# Patient Record
Sex: FEMALE | Race: WHITE | NOT HISPANIC OR LATINO | Employment: FULL TIME | URBAN - METROPOLITAN AREA
[De-identification: names, ages, dates, MRNs, and addresses within clinical notes are randomized per-mention and may not be internally consistent; named-entity substitution may affect disease eponyms.]

---

## 2019-01-25 ENCOUNTER — OFFICE VISIT (OUTPATIENT)
Dept: OBGYN CLINIC | Facility: CLINIC | Age: 19
End: 2019-01-25
Payer: COMMERCIAL

## 2019-01-25 VITALS
DIASTOLIC BLOOD PRESSURE: 80 MMHG | BODY MASS INDEX: 37.35 KG/M2 | SYSTOLIC BLOOD PRESSURE: 122 MMHG | WEIGHT: 238 LBS | HEIGHT: 67 IN

## 2019-01-25 DIAGNOSIS — Z30.011 INITIATION OF ORAL CONTRACEPTION: ICD-10-CM

## 2019-01-25 DIAGNOSIS — Z30.09 ENCOUNTER FOR COUNSELING REGARDING CONTRACEPTION: Primary | ICD-10-CM

## 2019-01-25 PROCEDURE — 99203 OFFICE O/P NEW LOW 30 MIN: CPT | Performed by: NURSE PRACTITIONER

## 2019-01-25 NOTE — PROGRESS NOTES
Lorene Zavala is a 22 y o  female who presents to establish care and restart OCP  The patient has no complaints today  The patient is sexually active  Pertinent past medical history: none  She was previously on OCPs, last taken in 2017 with no issues  She is requesting to restart OCPs  Menstrual History:    OB History      Para Term  AB Living    0 0 0 0 0 0    SAB TAB Ectopic Multiple Live Births    0 0 0 0 0         Gardasil is up-to-date    Menarche age: 14years old  Patient's last menstrual period was 01/10/2019  The following portions of the patient's history were reviewed and updated as appropriate: allergies, current medications, past family history, past medical history, past social history, past surgical history and problem list     Review of Systems  Pertinent items are noted in HPI  Objective      /80   Ht 5' 7" (1 702 m)   Wt 108 kg (238 lb)   LMP 01/10/2019   Breastfeeding? No   BMI 37 28 kg/m²     Physical Exam   Constitutional: She is oriented to person, place, and time  Vital signs are normal  She appears well-developed and well-nourished  HENT:   Head: Normocephalic and atraumatic  Neck: Neck supple  Cardiovascular: Normal rate and regular rhythm  Pulmonary/Chest: Effort normal and breath sounds normal    Neurological: She is alert and oriented to person, place, and time  Skin: Skin is warm and dry  Nursing note and vitals reviewed  Assessment     22 y o , starting condoms and OCP (estrogen/progesterone), no contraindications  Sharon Altamirano was seen today for contraception      Diagnoses and all orders for this visit:    Encounter for counseling regarding contraception  -     Norethin-Eth Estrad-Fe Biphas 1 MG-10 MCG / 10 MCG TABS; Take 1 tablet by mouth daily for 28 days    Initiation of oral contraception  -     Norethin-Eth Estrad-Fe Biphas 1 MG-10 MCG / 10 MCG TABS; Take 1 tablet by mouth daily for 28 days      I have reviewed the risk and benefits of Oral OCP's, including the risk of blood clots, elevated BP, weight gain and Headaches  Benefits include reducing painful menses and heavy bleeding  Follow up in 2 months

## 2019-03-29 ENCOUNTER — OFFICE VISIT (OUTPATIENT)
Dept: OBGYN CLINIC | Facility: CLINIC | Age: 19
End: 2019-03-29
Payer: COMMERCIAL

## 2019-03-29 VITALS
WEIGHT: 240 LBS | HEIGHT: 67 IN | BODY MASS INDEX: 37.67 KG/M2 | DIASTOLIC BLOOD PRESSURE: 82 MMHG | SYSTOLIC BLOOD PRESSURE: 118 MMHG

## 2019-03-29 DIAGNOSIS — Z30.41 SURVEILLANCE FOR BIRTH CONTROL, ORAL CONTRACEPTIVES: Primary | ICD-10-CM

## 2019-03-29 PROCEDURE — 99213 OFFICE O/P EST LOW 20 MIN: CPT | Performed by: NURSE PRACTITIONER

## 2019-03-29 NOTE — PROGRESS NOTES
Lorene Perez Arvind is a 22 y o  female who presents for contraception follow-up  Lin Schultz was started on oral ocp in January  She is doing well on this method and would like to continue on the oral pill  Denies CP/SOB  Her mood is stable  The patient has no complaints today  The patient is sexually active  Pertinent past medical history: none  Menstrual History:    OB History        0    Para   0    Term   0       0    AB   0    Living   0       SAB   0    TAB   0    Ectopic   0    Multiple   0    Live Births   0                  No LMP recorded  The following portions of the patient's history were reviewed and updated as appropriate: allergies, current medications, past family history, past medical history, past social history, past surgical history and problem list     Review of Systems  Pertinent items are noted in HPI  Objective      /82   Ht 5' 7" (1 702 m)   Wt 109 kg (240 lb)   BMI 37 59 kg/m²     Physical Exam   Constitutional: She is oriented to person, place, and time  Vital signs are normal  She appears well-developed and well-nourished  HENT:   Head: Normocephalic and atraumatic  Neck: Neck supple  Cardiovascular: Normal rate and regular rhythm  Pulmonary/Chest: Effort normal and breath sounds normal    Neurological: She is alert and oriented to person, place, and time  Skin: Skin is warm  Nursing note and vitals reviewed  Isidra Galvin was seen today for follow-up  Diagnoses and all orders for this visit:    Surveillance for birth control, oral contraceptives  -     Norethin-Eth Estrad-Fe Biphas 1 MG-10 MCG / 10 MCG TABS; Take 1 tablet by mouth daily for 84 days       22 y o , continuing OCP (estrogen/progesterone), no contraindications  A year prescription was sent to her pharmacy on file  Plan     Follow up in 1 year or sooner if needed  Ana Ponce

## 2019-04-06 DIAGNOSIS — Z30.011 INITIATION OF ORAL CONTRACEPTION: ICD-10-CM

## 2019-04-06 DIAGNOSIS — Z30.09 ENCOUNTER FOR COUNSELING REGARDING CONTRACEPTION: ICD-10-CM

## 2019-04-09 RX ORDER — NORETHINDRONE ACETATE AND ETHINYL ESTRADIOL, ETHINYL ESTRADIOL AND FERROUS FUMARATE 1MG-10(24)
KIT ORAL
Qty: 28 TABLET | Refills: 1 | Status: SHIPPED | OUTPATIENT
Start: 2019-04-09 | End: 2020-09-08 | Stop reason: ALTCHOICE

## 2019-09-09 DIAGNOSIS — Z30.011 INITIATION OF ORAL CONTRACEPTION: ICD-10-CM

## 2019-09-09 DIAGNOSIS — Z30.09 ENCOUNTER FOR COUNSELING REGARDING CONTRACEPTION: ICD-10-CM

## 2019-09-09 DIAGNOSIS — Z30.41 SURVEILLANCE FOR BIRTH CONTROL, ORAL CONTRACEPTIVES: ICD-10-CM

## 2020-02-28 ENCOUNTER — OFFICE VISIT (OUTPATIENT)
Dept: FAMILY MEDICINE CLINIC | Facility: CLINIC | Age: 20
End: 2020-02-28
Payer: COMMERCIAL

## 2020-02-28 VITALS
BODY MASS INDEX: 36.32 KG/M2 | OXYGEN SATURATION: 99 % | HEART RATE: 116 BPM | TEMPERATURE: 98.4 F | RESPIRATION RATE: 18 BRPM | HEIGHT: 67 IN | DIASTOLIC BLOOD PRESSURE: 80 MMHG | SYSTOLIC BLOOD PRESSURE: 140 MMHG | WEIGHT: 231.4 LBS

## 2020-02-28 DIAGNOSIS — F41.9 ANXIETY AND DEPRESSION: ICD-10-CM

## 2020-02-28 DIAGNOSIS — Z00.00 HEALTHCARE MAINTENANCE: ICD-10-CM

## 2020-02-28 DIAGNOSIS — Z76.89 ENCOUNTER TO ESTABLISH CARE: Primary | ICD-10-CM

## 2020-02-28 DIAGNOSIS — F32.A ANXIETY AND DEPRESSION: ICD-10-CM

## 2020-02-28 DIAGNOSIS — Z11.4 ENCOUNTER FOR SCREENING FOR HIV: ICD-10-CM

## 2020-02-28 DIAGNOSIS — R03.0 ELEVATED BP WITHOUT DIAGNOSIS OF HYPERTENSION: ICD-10-CM

## 2020-02-28 PROCEDURE — 1036F TOBACCO NON-USER: CPT | Performed by: NURSE PRACTITIONER

## 2020-02-28 PROCEDURE — 99203 OFFICE O/P NEW LOW 30 MIN: CPT | Performed by: NURSE PRACTITIONER

## 2020-02-28 PROCEDURE — 3008F BODY MASS INDEX DOCD: CPT | Performed by: NURSE PRACTITIONER

## 2020-02-28 RX ORDER — SERTRALINE HYDROCHLORIDE 25 MG/1
25 TABLET, FILM COATED ORAL DAILY
Qty: 30 TABLET | Refills: 0 | Status: SHIPPED | OUTPATIENT
Start: 2020-02-28 | End: 2020-03-23 | Stop reason: SDUPTHER

## 2020-02-28 NOTE — ASSESSMENT & PLAN NOTE
Discussed at length about importance of weight loss  Patient has been trying to lose weight  Encouraged low carb diet and exercise

## 2020-02-28 NOTE — ASSESSMENT & PLAN NOTE
Advised to keep an eye on the blood pressures at home  Will follow-up in 4 weeks with blood pressure check  Educated on salt reduction and weight loss

## 2020-02-28 NOTE — PROGRESS NOTES
BMI Counseling: Body mass index is 36 24 kg/m²  The BMI is above normal  Nutrition recommendations include decreasing portion sizes, decreasing fast food intake, consuming healthier snacks, limiting drinks that contain sugar and reducing intake of saturated and trans fat  Exercise recommendations include moderate physical activity 150 minutes/week and exercising 3-5 times per week  No pharmacotherapy was ordered  Depression Screening and Follow-up Plan: Patient's depression screening was positive with a PHQ-2 score of 4  Their PHQ-9 score was 16  Patient assessed for underlying major depression  Brief counseling provided and recommend additional follow-up/re-evaluation next office visit  Assessment/Plan:     Chronic Problems:  Anxiety and depression  Will start 25 mg Zoloft daily  Will follow-up in 4 weeks  Elevated BP without diagnosis of hypertension   Advised to keep an eye on the blood pressures at home  Will follow-up in 4 weeks with blood pressure check  Educated on salt reduction and weight loss  BMI 36 0-36 9,adult   Discussed at length about importance of weight loss  Patient has been trying to lose weight  Encouraged low carb diet and exercise  Visit Diagnosis:  Diagnoses and all orders for this visit:    Encounter to establish care    Encounter for screening for HIV  Comments:    Amenable to HIV screening  Orders:  -     HIV 1/2 AG-AB combo; Future    Healthcare maintenance  -     CBC and differential; Future  -     TSH, 3rd generation with Free T4 reflex; Future  -     Comprehensive metabolic panel; Future  -     Lipid panel; Future    Anxiety and depression  -     sertraline (ZOLOFT) 25 mg tablet; Take 1 tablet (25 mg total) by mouth daily    Elevated BP without diagnosis of hypertension    BMI 36 0-36 9,adult    Other orders  -     Cancel: Chlamydia/GC amplified DNA by PCR; Future          Subjective:    Patient ID: Jacobo Jarrett is a 20 y o  female        Patient is to establish care  She has not been seen since pediatrics about 3 years  Has HTN in the family with mom and dad  Concerned about high BP  She has had multiple elevated readings in the past   Today in the office, she is 140/80  She said that she has been a little bit higher in the past   She does have a documented blood pressures of 122/80 and 118/82  She said that she will keep an eye on  her blood pressure at home  She is having issues with anxiety depression  She says that they have flared since being in college  She does live in off campus housing by herself  She says she feels very anxious especially at night, that causes her sleep deprivation  She says some nights she is up all night thinking  She is ready to start on medication for anxiety depression  Denies suicidal thoughts  Going to [employer] for sociology, junior [employer]  Does have OB GYN in [place]-- getting OCP's  The following portions of the patient's history were reviewed and updated as appropriate: allergies, current medications, past family history, past medical history, past social history, past surgical history and problem list     Review of Systems   Constitutional: Negative for chills, fatigue and fever  Respiratory: Negative for chest tightness, shortness of breath and wheezing  Cardiovascular: Negative for chest pain and palpitations  Gastrointestinal: Negative  Genitourinary:        FDP: 1/28/2020   Psychiatric/Behavioral: Positive for dysphoric mood and sleep disturbance  Negative for self-injury and suicidal ideas  The patient is nervous/anxious            /80   Pulse (!) 116   Temp 98 4 °F (36 9 °C) (Tympanic)   Resp 18   Ht 5' 7" (1 702 m)   Wt 105 kg (231 lb 6 4 oz)   SpO2 99%   BMI 36 24 kg/m²   Social History     Socioeconomic History    Marital status: Single     Spouse name: Not on file    Number of children: Not on file    Years of education: Not on file    Highest education level: Not on file   Occupational History    Not on file   Social Needs    Financial resource strain: Not on file    Food insecurity:     Worry: Not on file     Inability: Not on file    Transportation needs:     Medical: Not on file     Non-medical: Not on file   Tobacco Use    Smoking status: Never Smoker    Smokeless tobacco: Never Used   Substance and Sexual Activity    Alcohol use: No    Drug use: No    Sexual activity: Yes     Partners: Male     Birth control/protection: OCP   Lifestyle    Physical activity:     Days per week: Not on file     Minutes per session: Not on file    Stress: Not on file   Relationships    Social connections:     Talks on phone: Not on file     Gets together: Not on file     Attends Sikh service: Not on file     Active member of club or organization: Not on file     Attends meetings of clubs or organizations: Not on file     Relationship status: Not on file    Intimate partner violence:     Fear of current or ex partner: Not on file     Emotionally abused: Not on file     Physically abused: Not on file     Forced sexual activity: Not on file   Other Topics Concern    Not on file   Social History Narrative    Not on file     Past Medical History:   Diagnosis Date    Migraine      Family History   Problem Relation Age of Onset    Breast cancer Mother 39    Hypertension Mother     Hypertension Father     Asthma Brother     Heart defect Brother      Past Surgical History:   Procedure Laterality Date    ANTERIOR CRUCIATE LIGAMENT REPAIR  2016       Current Outpatient Medications:     LO LOESTRIN FE 1 MG-10 MCG / 10 MCG TABS, take 1 tablet by mouth once daily for 28 DAYS, Disp: 28 tablet, Rfl: 1    sertraline (ZOLOFT) 25 mg tablet, Take 1 tablet (25 mg total) by mouth daily, Disp: 30 tablet, Rfl: 0    Allergies   Allergen Reactions    Nickel           Lab Review   No visits with results within 2 Month(s) from this visit     Latest known visit with results is: No results found for any previous visit  Imaging: No results found  Objective:     Physical Exam   Constitutional: She is oriented to person, place, and time  She appears well-developed and well-nourished  HENT:   Head: Normocephalic  Right Ear: Tympanic membrane normal    Left Ear: Tympanic membrane normal    Mouth/Throat: Oropharynx is clear and moist  No oropharyngeal exudate  Eyes: Pupils are equal, round, and reactive to light  Conjunctivae and EOM are normal  Right eye exhibits no discharge  Left eye exhibits no discharge  Neck: Normal range of motion  Neck supple  Cardiovascular: Normal rate, regular rhythm and normal heart sounds  Pulmonary/Chest: Effort normal and breath sounds normal  No respiratory distress  She has no wheezes  Abdominal: Soft  Bowel sounds are normal    Musculoskeletal: Normal range of motion  Lymphadenopathy:     She has no cervical adenopathy  Neurological: She is alert and oriented to person, place, and time  Skin: Skin is warm and dry  Psychiatric: She has a normal mood and affect  Vitals reviewed  Patient Instructions   Take zoloft daily  Weight Management   AMBULATORY CARE:   Why it is important to manage your weight:  Being overweight increases your risk of health conditions such as heart disease, high blood pressure, type 2 diabetes, and certain types of cancer  It can also increase your risk for osteoarthritis, sleep apnea, and other respiratory problems  Aim for a slow, steady weight loss  Even a small amount of weight loss can lower your risk of health problems  How to lose weight safely:  A safe and healthy way to lose weight is to eat fewer calories and get regular exercise  You can lose up about 1 pound a week by decreasing the number of calories you eat by 500 calories each day  You can decrease calories by eating smaller portion sizes or by cutting out high-calorie foods   Read labels to find out how many calories are in the foods you eat  You can also burn calories with exercise such as walking, swimming, or biking  You will be more likely to keep weight off if you make these changes part of your lifestyle  Healthy meal plan for weight management:  A healthy meal plan includes a variety of foods, contains fewer calories, and helps you stay healthy  A healthy meal plan includes the following:  · Eat whole-grain foods more often  A healthy meal plan should contain fiber  Fiber is the part of grains, fruits, and vegetables that is not broken down by your body  Whole-grain foods are healthy and provide extra fiber in your diet  Some examples of whole-grain foods are whole-wheat breads and pastas, oatmeal, brown rice, and bulgur  · Eat a variety of vegetables every day  Include dark, leafy greens such as spinach, kale, eri greens, and mustard greens  Eat yellow and orange vegetables such as carrots, sweet potatoes, and winter squash  · Eat a variety of fruits every day  Choose fresh or canned fruit (canned in its own juice or light syrup) instead of juice  Fruit juice has very little or no fiber  · Eat low-fat dairy foods  Drink fat-free (skim) milk or 1% milk  Eat fat-free yogurt and low-fat cottage cheese  Try low-fat cheeses such as mozzarella and other reduced-fat cheeses  · Choose meat and other protein foods that are low in fat  Choose beans or other legumes such as split peas or lentils  Choose fish, skinless poultry (chicken or turkey), or lean cuts of red meat (beef or pork)  Before you cook meat or poultry, cut off any visible fat  · Use less fat and oil  Try baking foods instead of frying them  Add less fat, such as margarine, sour cream, regular salad dressing and mayonnaise to foods  Eat fewer high-fat foods  Some examples of high-fat foods include french fries, doughnuts, ice cream, and cakes  · Eat fewer sweets  Limit foods and drinks that are high in sugar   This includes candy, cookies, regular soda, and sweetened drinks  Ways to decrease calories:   · Eat smaller portions  ¨ Use a small plate with smaller servings  ¨ Do not eat second helpings  ¨ When you eat at a restaurant, ask for a box and place half of your meal in the box before you eat  ¨ Share an entrée with someone else  · Replace high-calorie snacks with healthy, low-calorie snacks  ¨ Choose fresh fruit, vegetables, fat-free rice cakes, or air-popped popcorn instead of potato chips, nuts, or chocolate  ¨ Choose water or calorie-free drinks instead of soda or sweetened drinks  · Eat regular meals  Skipping meals can lead to overeating later in the day  Eat a healthy snack in place of a meal if you do not have time to eat a regular meal      · Do not shop for groceries when you are hungry  You may be more likely to make unhealthy food choices  Take a grocery list of healthy foods and shop after you have eaten  Exercise:  Exercise at least 30 minutes per day on most days of the week  Some examples of exercise include walking, biking, dancing, and swimming  You can also fit in more physical activity by taking the stairs instead of the elevator or parking farther away from stores  Ask your healthcare provider about the best exercise plan for you  Other things to consider as you try to lose weight:   · Be aware of situations that may give you the urge to overeat, such as eating while watching television  Find ways to avoid these situations  For example, read a book, go for a walk, or do crafts  · Meet with a weight loss support group or friends who are also trying to lose weight  This may help you stay motivated to continue working on your weight loss goals  © 2017 [address] [employer] Information is for End User's use only and may not be sold, redistributed or otherwise used for commercial purposes   All illustrations and images included in CareNotes® are the copyrighted property of [employer]  or [employer]  The above information is an  only  It is not intended as medical advice for individual conditions or treatments  Talk to your doctor, nurse or pharmacist before following any medical regimen to see if it is safe and effective for you  LEONILA Thurston    Portions of the record may have been created with voice recognition software  Occasional wrong word or "sound a like" substitutions may have occurred due to the inherent limitations of voice recognition software  Read the chart carefully and recognize, using context, where substitutions have occurred  BMI Counseling: Body mass index is 36 24 kg/m²  The BMI is above normal  Nutrition recommendations include reducing portion sizes, decreasing overall calorie intake, 3-5 servings of fruits/vegetables daily, reducing fast food intake, consuming healthier snacks, decreasing soda and/or juice intake and moderation in carbohydrate intake  Exercise recommendations include moderate aerobic physical activity for 150 minutes/week, vigorous aerobic physical activity for 75 minutes/week and exercising 3-5 times per week

## 2020-02-28 NOTE — PATIENT INSTRUCTIONS
Take zoloft daily  Weight Management   AMBULATORY CARE:   Why it is important to manage your weight:  Being overweight increases your risk of health conditions such as heart disease, high blood pressure, type 2 diabetes, and certain types of cancer  It can also increase your risk for osteoarthritis, sleep apnea, and other respiratory problems  Aim for a slow, steady weight loss  Even a small amount of weight loss can lower your risk of health problems  How to lose weight safely:  A safe and healthy way to lose weight is to eat fewer calories and get regular exercise  You can lose up about 1 pound a week by decreasing the number of calories you eat by 500 calories each day  You can decrease calories by eating smaller portion sizes or by cutting out high-calorie foods  Read labels to find out how many calories are in the foods you eat  You can also burn calories with exercise such as walking, swimming, or biking  You will be more likely to keep weight off if you make these changes part of your lifestyle  Healthy meal plan for weight management:  A healthy meal plan includes a variety of foods, contains fewer calories, and helps you stay healthy  A healthy meal plan includes the following:  · Eat whole-grain foods more often  A healthy meal plan should contain fiber  Fiber is the part of grains, fruits, and vegetables that is not broken down by your body  Whole-grain foods are healthy and provide extra fiber in your diet  Some examples of whole-grain foods are whole-wheat breads and pastas, oatmeal, brown rice, and bulgur  · Eat a variety of vegetables every day  Include dark, leafy greens such as spinach, kale, eri greens, and mustard greens  Eat yellow and orange vegetables such as carrots, sweet potatoes, and winter squash  · Eat a variety of fruits every day  Choose fresh or canned fruit (canned in its own juice or light syrup) instead of juice  Fruit juice has very little or no fiber      · Eat low-fat dairy foods  Drink fat-free (skim) milk or 1% milk  Eat fat-free yogurt and low-fat cottage cheese  Try low-fat cheeses such as mozzarella and other reduced-fat cheeses  · Choose meat and other protein foods that are low in fat  Choose beans or other legumes such as split peas or lentils  Choose fish, skinless poultry (chicken or turkey), or lean cuts of red meat (beef or pork)  Before you cook meat or poultry, cut off any visible fat  · Use less fat and oil  Try baking foods instead of frying them  Add less fat, such as margarine, sour cream, regular salad dressing and mayonnaise to foods  Eat fewer high-fat foods  Some examples of high-fat foods include french fries, doughnuts, ice cream, and cakes  · Eat fewer sweets  Limit foods and drinks that are high in sugar  This includes candy, cookies, regular soda, and sweetened drinks  Ways to decrease calories:   · Eat smaller portions  ¨ Use a small plate with smaller servings  ¨ Do not eat second helpings  ¨ When you eat at a restaurant, ask for a box and place half of your meal in the box before you eat  ¨ Share an entrée with someone else  · Replace high-calorie snacks with healthy, low-calorie snacks  ¨ Choose fresh fruit, vegetables, fat-free rice cakes, or air-popped popcorn instead of potato chips, nuts, or chocolate  ¨ Choose water or calorie-free drinks instead of soda or sweetened drinks  · Eat regular meals  Skipping meals can lead to overeating later in the day  Eat a healthy snack in place of a meal if you do not have time to eat a regular meal      · Do not shop for groceries when you are hungry  You may be more likely to make unhealthy food choices  Take a grocery list of healthy foods and shop after you have eaten  Exercise:  Exercise at least 30 minutes per day on most days of the week  Some examples of exercise include walking, biking, dancing, and swimming   You can also fit in more physical activity by taking the stairs instead of the elevator or parking farther away from stores  Ask your healthcare provider about the best exercise plan for you  Other things to consider as you try to lose weight:   · Be aware of situations that may give you the urge to overeat, such as eating while watching television  Find ways to avoid these situations  For example, read a book, go for a walk, or do crafts  · Meet with a weight loss support group or friends who are also trying to lose weight  This may help you stay motivated to continue working on your weight loss goals  © 2017 [employer] Information is for End User's use only and may not be sold, redistributed or otherwise used for commercial purposes  All illustrations and images included in CareNotes® are the copyrighted property of [employer]  or [employer] Gao  The above information is an  only  It is not intended as medical advice for individual conditions or treatments  Talk to your doctor, nurse or pharmacist before following any medical regimen to see if it is safe and effective for you

## 2020-03-23 DIAGNOSIS — F41.9 ANXIETY AND DEPRESSION: ICD-10-CM

## 2020-03-23 DIAGNOSIS — F32.A ANXIETY AND DEPRESSION: ICD-10-CM

## 2020-03-23 RX ORDER — SERTRALINE HYDROCHLORIDE 25 MG/1
25 TABLET, FILM COATED ORAL DAILY
Qty: 30 TABLET | Refills: 0 | Status: SHIPPED | OUTPATIENT
Start: 2020-03-23 | End: 2020-03-27 | Stop reason: SDUPTHER

## 2020-03-27 ENCOUNTER — TELEMEDICINE (OUTPATIENT)
Dept: FAMILY MEDICINE CLINIC | Facility: CLINIC | Age: 20
End: 2020-03-27
Payer: COMMERCIAL

## 2020-03-27 DIAGNOSIS — F41.9 ANXIETY AND DEPRESSION: ICD-10-CM

## 2020-03-27 DIAGNOSIS — F32.A ANXIETY AND DEPRESSION: ICD-10-CM

## 2020-03-27 PROCEDURE — 99214 OFFICE O/P EST MOD 30 MIN: CPT | Performed by: NURSE PRACTITIONER

## 2020-03-27 NOTE — PROGRESS NOTES
Virtual Brief Visit    Problem List Items Addressed This Visit        Other    Anxiety and depression                Reason for visit is for follow up on sertraline  Encounter provider LEONILA Ontiveros    Provider located at [facility] [address] [facility]  [address] [place] [address] [address]      Recent Visits  No visits were found meeting these conditions  Showing recent visits within past 7 days and meeting all other requirements     Today's Visits  Date Type Provider Dept   03/27/20 Telemedicine LEONILA Thurston  [facility] [address] today's visits and meeting all other requirements     Future Appointments  No visits were found meeting these conditions  Showing future appointments within next 150 days and meeting all other requirements        After connecting through telephone, the patient was identified by name and date of birth  Yayo Crain was informed that this is a telemedicine visit and that the visit is being conducted through telephone  My office door was closed  No one else was in the room  She acknowledged consent and understanding of privacy and security of the video platform  The patient has agreed to participate and understands they can discontinue the visit at any time  Patient is aware this is a billable service  Subjective  Yayo Crain is a 20 y o  female presenting via telephone visit for a follow up on her sertraline  She is feeling much better on the medication, much less anxiety  She does feel she would benefit from an increased dose  No side effects with the meds  Denies SI         Past Medical History:   Diagnosis Date    Migraine        Past Surgical History:   Procedure Laterality Date    ANTERIOR CRUCIATE LIGAMENT REPAIR  2016       Current Outpatient Medications   Medication Sig Dispense Refill    LO LOESTRIN FE 1 MG-10 MCG / 10 MCG TABS take 1 tablet by mouth once daily for 28 DAYS 28 tablet 1    sertraline (ZOLOFT) 25 mg tablet Take 1 tablet (25 mg total) by mouth daily 30 tablet 0     No current facility-administered medications for this visit  Allergies   Allergen Reactions    Nickel        Review of Systems   Constitutional: Negative  HENT: Negative  Respiratory: Negative  Cardiovascular: Negative  Gastrointestinal: Negative  Psychiatric/Behavioral: Positive for dysphoric mood  Negative for self-injury, sleep disturbance and suicidal ideas  The patient is nervous/anxious (much improved)  Carisa Brooks was seen today for virtual brief visit  Diagnoses and all orders for this visit:    Anxiety and depression  Comments: Will increase sertraline to 50mg daily  I spent 15 minutes with the patient during this visit

## 2020-09-08 ENCOUNTER — OFFICE VISIT (OUTPATIENT)
Dept: FAMILY MEDICINE CLINIC | Facility: CLINIC | Age: 20
End: 2020-09-08
Payer: COMMERCIAL

## 2020-09-08 VITALS
BODY MASS INDEX: 36.73 KG/M2 | HEART RATE: 120 BPM | OXYGEN SATURATION: 99 % | WEIGHT: 234 LBS | TEMPERATURE: 98.6 F | HEIGHT: 67 IN | DIASTOLIC BLOOD PRESSURE: 80 MMHG | SYSTOLIC BLOOD PRESSURE: 124 MMHG

## 2020-09-08 DIAGNOSIS — F41.9 ANXIETY AND DEPRESSION: Primary | ICD-10-CM

## 2020-09-08 DIAGNOSIS — F32.A ANXIETY AND DEPRESSION: Primary | ICD-10-CM

## 2020-09-08 DIAGNOSIS — R19.7 DIARRHEA, UNSPECIFIED TYPE: ICD-10-CM

## 2020-09-08 PROCEDURE — 99214 OFFICE O/P EST MOD 30 MIN: CPT | Performed by: NURSE PRACTITIONER

## 2020-09-08 RX ORDER — ESCITALOPRAM OXALATE 5 MG/1
5 TABLET ORAL DAILY
Qty: 30 TABLET | Refills: 0 | Status: SHIPPED | OUTPATIENT
Start: 2020-09-08 | End: 2020-10-05 | Stop reason: SDUPTHER

## 2020-09-08 NOTE — PROGRESS NOTES
Assessment/Plan:     Chronic Problems:  Anxiety and depression  Will start patient on Lexapro daily  Visit Diagnosis:  Diagnoses and all orders for this visit:    Anxiety and depression  -     escitalopram (LEXAPRO) 5 mg tablet; Take 1 tablet (5 mg total) by mouth daily    Diarrhea, unspecified type  Comments:  Discussed irritable bowel syndrome and anxiety  Patient will start a probiotic, will obtain celiac testing  Will increase hydration and follow-up in 4 weeks  Orders:  -     Celiac Disease Panel; Future          Subjective:    Patient ID: Annie Camacho is a 23 y o  female  Patient presents with stomach pain for months, started in May  Bad stomach cramps in the morning  Sometimes, she will have BM and sometimes she doesn't, just cramping  She does have some constipation, always loose stools  Very uncomfortable prior to BM  She does get very nauseous prior to BM and does have vomiting sometimes  Worse with anxiety and stress  Seems to be unrelated to food  Less frequent hydration recently due to schedule changes  Stopped her zoloft end of May and her OCP end of May  Anxiety is very bad right now  She felt the zoloft kept her awake at night  The following portions of the patient's history were reviewed and updated as appropriate: allergies, current medications, past family history, past medical history, past social history, past surgical history and problem list     Review of Systems   Constitutional: Negative for chills, fatigue and fever  HENT: Negative  Respiratory: Negative for chest tightness, shortness of breath and wheezing  Cardiovascular: Negative for chest pain and palpitations  Gastrointestinal: Positive for abdominal pain, constipation, diarrhea, nausea and vomiting  Negative for blood in stool and rectal pain  Genitourinary: Negative for difficulty urinating, flank pain, frequency, hematuria, pelvic pain and urgency  Musculoskeletal: Negative  Neurological: Negative for dizziness, light-headedness, numbness and headaches  Psychiatric/Behavioral: Negative for dysphoric mood and sleep disturbance  The patient is nervous/anxious            /80   Pulse (!) 120   Temp 98 6 °F (37 °C)   Ht 5' 7" (1 702 m)   Wt 106 kg (234 lb)   SpO2 99%   BMI 36 65 kg/m²   Social History     Socioeconomic History    Marital status: Single     Spouse name: Not on file    Number of children: Not on file    Years of education: Not on file    Highest education level: Not on file   Occupational History    Not on file   Social Needs    Financial resource strain: Not on file    Food insecurity     Worry: Not on file     Inability: Not on file    Transportation needs     Medical: Not on file     Non-medical: Not on file   Tobacco Use    Smoking status: Never Smoker    Smokeless tobacco: Never Used   Substance and Sexual Activity    Alcohol use: No    Drug use: No    Sexual activity: Yes     Partners: Male     Birth control/protection: OCP   Lifestyle    Physical activity     Days per week: Not on file     Minutes per session: Not on file    Stress: Not on file   Relationships    Social connections     Talks on phone: Not on file     Gets together: Not on file     Attends Mormonism service: Not on file     Active member of club or organization: Not on file     Attends meetings of clubs or organizations: Not on file     Relationship status: Not on file    Intimate partner violence     Fear of current or ex partner: Not on file     Emotionally abused: Not on file     Physically abused: Not on file     Forced sexual activity: Not on file   Other Topics Concern    Not on file   Social History Narrative    Not on file     Past Medical History:   Diagnosis Date    Migraine      Family History   Problem Relation Age of Onset    Breast cancer Mother 39    Hypertension Mother     Hypertension Father     Asthma Brother     Heart defect Brother      Past Surgical History:   Procedure Laterality Date    ANTERIOR CRUCIATE LIGAMENT REPAIR  2016       Current Outpatient Medications:     escitalopram (LEXAPRO) 5 mg tablet, Take 1 tablet (5 mg total) by mouth daily, Disp: 30 tablet, Rfl: 0    Allergies   Allergen Reactions    Nickel           Lab Review   No visits with results within 2 Month(s) from this visit  Latest known visit with results is:   No results found for any previous visit  Imaging: No results found  Objective:     Physical Exam  Constitutional:       Appearance: She is well-developed  Cardiovascular:      Rate and Rhythm: Normal rate and regular rhythm  Heart sounds: Normal heart sounds  No murmur  Pulmonary:      Effort: Pulmonary effort is normal  No respiratory distress  Breath sounds: Normal breath sounds  Abdominal:      General: Abdomen is flat  Bowel sounds are normal  There is no distension  Palpations: Abdomen is soft  Tenderness: There is no abdominal tenderness  There is no guarding  Skin:     General: Skin is warm and dry  Neurological:      Mental Status: She is alert and oriented to person, place, and time  There are no Patient Instructions on file for this visit  LEONILA Thurston    Portions of the record may have been created with voice recognition software  Occasional wrong word or "sound a like" substitutions may have occurred due to the inherent limitations of voice recognition software  Read the chart carefully and recognize, using context, where substitutions have occurred

## 2020-09-18 ENCOUNTER — OFFICE VISIT (OUTPATIENT)
Dept: FAMILY MEDICINE CLINIC | Facility: CLINIC | Age: 20
End: 2020-09-18
Payer: COMMERCIAL

## 2020-09-18 VITALS
HEART RATE: 83 BPM | HEIGHT: 67 IN | TEMPERATURE: 97.3 F | DIASTOLIC BLOOD PRESSURE: 80 MMHG | BODY MASS INDEX: 37.35 KG/M2 | SYSTOLIC BLOOD PRESSURE: 124 MMHG | WEIGHT: 238 LBS | OXYGEN SATURATION: 99 %

## 2020-09-18 DIAGNOSIS — K21.9 GASTROESOPHAGEAL REFLUX DISEASE WITHOUT ESOPHAGITIS: Primary | ICD-10-CM

## 2020-09-18 DIAGNOSIS — K58.2 IRRITABLE BOWEL SYNDROME WITH BOTH CONSTIPATION AND DIARRHEA: ICD-10-CM

## 2020-09-18 DIAGNOSIS — R11.0 NAUSEA: ICD-10-CM

## 2020-09-18 PROCEDURE — 99214 OFFICE O/P EST MOD 30 MIN: CPT | Performed by: NURSE PRACTITIONER

## 2020-09-18 RX ORDER — PANTOPRAZOLE SODIUM 40 MG/1
40 TABLET, DELAYED RELEASE ORAL DAILY
Qty: 30 TABLET | Refills: 1 | Status: SHIPPED | OUTPATIENT
Start: 2020-09-18 | End: 2020-12-28 | Stop reason: ALTCHOICE

## 2020-09-18 RX ORDER — NORETHINDRONE ACETATE AND ETHINYL ESTRADIOL AND FERROUS FUMARATE 1MG-20(24)
1 KIT ORAL DAILY
COMMUNITY
End: 2020-12-28 | Stop reason: ALTCHOICE

## 2020-09-18 RX ORDER — ONDANSETRON 4 MG/1
4 TABLET, ORALLY DISINTEGRATING ORAL EVERY 6 HOURS PRN
Qty: 20 TABLET | Refills: 0 | Status: SHIPPED | OUTPATIENT
Start: 2020-09-18 | End: 2021-12-17 | Stop reason: SDUPTHER

## 2020-09-18 NOTE — PROGRESS NOTES
Assessment/Plan:     Chronic Problems:  Gastroesophageal reflux disease without esophagitis  Will start on protonix daily  Discussed GERD lifestyle management with patient  Avoid caffeine, citrus, alcohol, carbonation, sit upright after meals, do not eat too close to bedtime  Visit Diagnosis:  Diagnoses and all orders for this visit:    Gastroesophageal reflux disease without esophagitis  -     pantoprazole (PROTONIX) 40 mg tablet; Take 1 tablet (40 mg total) by mouth daily  -     Ambulatory referral to Gastroenterology; Future    Irritable bowel syndrome with both constipation and diarrhea  -     pantoprazole (PROTONIX) 40 mg tablet; Take 1 tablet (40 mg total) by mouth daily  -     Ambulatory referral to Gastroenterology; Future    Nausea  -     ondansetron (ZOFRAN-ODT) 4 mg disintegrating tablet; Take 1 tablet (4 mg total) by mouth every 6 (six) hours as needed for nausea or vomiting    Other orders  -     norethindrone-ethinyl estradiol-ferrous fumarate (LOESTIN 24 FE) 1-20 MG-MCG(24) per tablet; Take 1 tablet by mouth daily          Subjective:    Patient ID: Natasha Nur is a 23 y o  female  Here for a few weeks of vomiting and nausea  Feels lump in her throat when she needs to vomit  She feels vomiting is becoming more frequent recently  She states she is not pregnant  She did just restart OCP, but vomiting happening prior  Vomiting/nausea does not occur with food  Mostly occurs in the morning  Has acid reflux sometimes  No diet changes recently  No blood in the vomit  Usually throwing up bile/stomach acid or water  She says the vomiting is never related to eating and never has food particles in it  Having some diarrhea-- consistent for past few months  Occurs suddenly, fine after vomiting  She states she does have constant bowel issues with constipation and diarrhea, she believes she has IBS         The following portions of the patient's history were reviewed and updated as appropriate: allergies, current medications, past family history, past medical history, past social history, past surgical history and problem list     Review of Systems   Constitutional: Negative for chills, diaphoresis, fatigue and fever  HENT: Negative  Respiratory: Negative for chest tightness, shortness of breath and wheezing  Cardiovascular: Negative for chest pain and palpitations  Gastrointestinal: Positive for diarrhea, nausea and vomiting  Negative for abdominal distention, abdominal pain and constipation  Bloating   Genitourinary: Negative for difficulty urinating, flank pain, frequency, hematuria, pelvic pain and urgency  Musculoskeletal: Negative  Neurological: Negative for dizziness, light-headedness, numbness and headaches  Psychiatric/Behavioral: Negative for dysphoric mood and sleep disturbance  The patient is nervous/anxious            /80   Pulse 83   Temp (!) 97 3 °F (36 3 °C)   Ht 5' 7" (1 702 m)   Wt 108 kg (238 lb)   SpO2 99%   BMI 37 28 kg/m²   Social History     Socioeconomic History    Marital status: Single     Spouse name: Not on file    Number of children: Not on file    Years of education: Not on file    Highest education level: Not on file   Occupational History    Not on file   Social Needs    Financial resource strain: Not on file    Food insecurity     Worry: Not on file     Inability: Not on file    Transportation needs     Medical: Not on file     Non-medical: Not on file   Tobacco Use    Smoking status: Never Smoker    Smokeless tobacco: Never Used   Substance and Sexual Activity    Alcohol use: No    Drug use: No    Sexual activity: Yes     Partners: Male     Birth control/protection: OCP   Lifestyle    Physical activity     Days per week: Not on file     Minutes per session: Not on file    Stress: Not on file   Relationships    Social connections     Talks on phone: Not on file     Gets together: Not on file     Attends Hinduism service: Not on file     Active member of club or organization: Not on file     Attends meetings of clubs or organizations: Not on file     Relationship status: Not on file    Intimate partner violence     Fear of current or ex partner: Not on file     Emotionally abused: Not on file     Physically abused: Not on file     Forced sexual activity: Not on file   Other Topics Concern    Not on file   Social History Narrative    Not on file     Past Medical History:   Diagnosis Date    Migraine      Family History   Problem Relation Age of Onset    Breast cancer Mother 39    Hypertension Mother     Hypertension Father     Asthma Brother     Heart defect Brother      Past Surgical History:   Procedure Laterality Date    ANTERIOR CRUCIATE LIGAMENT REPAIR  2016       Current Outpatient Medications:     escitalopram (LEXAPRO) 5 mg tablet, Take 1 tablet (5 mg total) by mouth daily, Disp: 30 tablet, Rfl: 0    norethindrone-ethinyl estradiol-ferrous fumarate (LOESTIN 24 FE) 1-20 MG-MCG(24) per tablet, Take 1 tablet by mouth daily, Disp: , Rfl:     ondansetron (ZOFRAN-ODT) 4 mg disintegrating tablet, Take 1 tablet (4 mg total) by mouth every 6 (six) hours as needed for nausea or vomiting, Disp: 20 tablet, Rfl: 0    pantoprazole (PROTONIX) 40 mg tablet, Take 1 tablet (40 mg total) by mouth daily, Disp: 30 tablet, Rfl: 1    Allergies   Allergen Reactions    Nickel           Lab Review   No visits with results within 2 Month(s) from this visit  Latest known visit with results is:   No results found for any previous visit  Imaging: No results found  Objective:     Physical Exam  Constitutional:       Appearance: She is well-developed  Cardiovascular:      Rate and Rhythm: Normal rate and regular rhythm  Heart sounds: Normal heart sounds  No murmur  Pulmonary:      Effort: Pulmonary effort is normal  No respiratory distress  Breath sounds: Normal breath sounds     Abdominal: General: Abdomen is flat  Bowel sounds are normal  There is no distension  Palpations: There is no mass  Tenderness: There is no abdominal tenderness  Skin:     General: Skin is warm and dry  Neurological:      Mental Status: She is alert and oriented to person, place, and time  There are no Patient Instructions on file for this visit  LEONILA Thurston    Portions of the record may have been created with voice recognition software  Occasional wrong word or "sound a like" substitutions may have occurred due to the inherent limitations of voice recognition software  Read the chart carefully and recognize, using context, where substitutions have occurred

## 2020-09-18 NOTE — ASSESSMENT & PLAN NOTE
Will start on protonix daily  Discussed GERD lifestyle management with patient  Avoid caffeine, citrus, alcohol, carbonation, sit upright after meals, do not eat too close to bedtime

## 2020-10-05 ENCOUNTER — OFFICE VISIT (OUTPATIENT)
Dept: GASTROENTEROLOGY | Facility: CLINIC | Age: 20
End: 2020-10-05
Payer: COMMERCIAL

## 2020-10-05 ENCOUNTER — TELEPHONE (OUTPATIENT)
Dept: FAMILY MEDICINE CLINIC | Facility: CLINIC | Age: 20
End: 2020-10-05

## 2020-10-05 VITALS
TEMPERATURE: 97.5 F | HEIGHT: 67 IN | HEART RATE: 62 BPM | BODY MASS INDEX: 37.2 KG/M2 | DIASTOLIC BLOOD PRESSURE: 68 MMHG | SYSTOLIC BLOOD PRESSURE: 116 MMHG | WEIGHT: 237 LBS

## 2020-10-05 DIAGNOSIS — F41.9 ANXIETY AND DEPRESSION: ICD-10-CM

## 2020-10-05 DIAGNOSIS — K21.9 GASTROESOPHAGEAL REFLUX DISEASE WITHOUT ESOPHAGITIS: ICD-10-CM

## 2020-10-05 DIAGNOSIS — K58.2 IRRITABLE BOWEL SYNDROME WITH BOTH CONSTIPATION AND DIARRHEA: ICD-10-CM

## 2020-10-05 DIAGNOSIS — F32.A ANXIETY AND DEPRESSION: ICD-10-CM

## 2020-10-05 PROCEDURE — 99203 OFFICE O/P NEW LOW 30 MIN: CPT | Performed by: PHYSICIAN ASSISTANT

## 2020-10-05 PROCEDURE — 1036F TOBACCO NON-USER: CPT | Performed by: PHYSICIAN ASSISTANT

## 2020-10-05 RX ORDER — ESCITALOPRAM OXALATE 5 MG/1
5 TABLET ORAL DAILY
Qty: 30 TABLET | Refills: 0 | Status: SHIPPED | OUTPATIENT
Start: 2020-10-05 | End: 2020-10-26 | Stop reason: SDUPTHER

## 2020-10-15 LAB — EXT SARS-COV-2: NOT DETECTED

## 2020-10-19 ENCOUNTER — LAB REQUISITION (OUTPATIENT)
Dept: LAB | Facility: HOSPITAL | Age: 20
End: 2020-10-19
Payer: COMMERCIAL

## 2020-10-19 DIAGNOSIS — K63.5 POLYP OF COLON: ICD-10-CM

## 2020-10-19 DIAGNOSIS — K58.0 IRRITABLE BOWEL SYNDROME WITH DIARRHEA: ICD-10-CM

## 2020-10-19 PROCEDURE — 88305 TISSUE EXAM BY PATHOLOGIST: CPT | Performed by: PATHOLOGY

## 2020-10-20 ENCOUNTER — TELEPHONE (OUTPATIENT)
Dept: GASTROENTEROLOGY | Facility: CLINIC | Age: 20
End: 2020-10-20

## 2020-10-26 ENCOUNTER — OFFICE VISIT (OUTPATIENT)
Dept: FAMILY MEDICINE CLINIC | Facility: CLINIC | Age: 20
End: 2020-10-26
Payer: COMMERCIAL

## 2020-10-26 VITALS
RESPIRATION RATE: 14 BRPM | TEMPERATURE: 97.2 F | HEART RATE: 76 BPM | WEIGHT: 242.8 LBS | OXYGEN SATURATION: 99 % | BODY MASS INDEX: 38.11 KG/M2 | DIASTOLIC BLOOD PRESSURE: 85 MMHG | SYSTOLIC BLOOD PRESSURE: 120 MMHG | HEIGHT: 67 IN

## 2020-10-26 DIAGNOSIS — F41.9 ANXIETY AND DEPRESSION: Primary | ICD-10-CM

## 2020-10-26 DIAGNOSIS — K58.2 IRRITABLE BOWEL SYNDROME WITH BOTH CONSTIPATION AND DIARRHEA: ICD-10-CM

## 2020-10-26 DIAGNOSIS — F32.A ANXIETY AND DEPRESSION: Primary | ICD-10-CM

## 2020-10-26 DIAGNOSIS — K21.9 GASTROESOPHAGEAL REFLUX DISEASE WITHOUT ESOPHAGITIS: ICD-10-CM

## 2020-10-26 PROCEDURE — 3725F SCREEN DEPRESSION PERFORMED: CPT | Performed by: NURSE PRACTITIONER

## 2020-10-26 PROCEDURE — 99214 OFFICE O/P EST MOD 30 MIN: CPT | Performed by: NURSE PRACTITIONER

## 2020-10-26 RX ORDER — BUSPIRONE HYDROCHLORIDE 5 MG/1
5 TABLET ORAL 2 TIMES DAILY
Qty: 60 TABLET | Refills: 0 | Status: SHIPPED | OUTPATIENT
Start: 2020-10-26 | End: 2020-12-28 | Stop reason: SDUPTHER

## 2020-10-26 RX ORDER — ESCITALOPRAM OXALATE 10 MG/1
10 TABLET ORAL DAILY
Qty: 30 TABLET | Refills: 1 | Status: SHIPPED | OUTPATIENT
Start: 2020-10-26 | End: 2020-12-28 | Stop reason: SDUPTHER

## 2020-10-28 ENCOUNTER — OFFICE VISIT (OUTPATIENT)
Dept: GASTROENTEROLOGY | Facility: CLINIC | Age: 20
End: 2020-10-28
Payer: COMMERCIAL

## 2020-10-28 VITALS
HEART RATE: 68 BPM | SYSTOLIC BLOOD PRESSURE: 118 MMHG | BODY MASS INDEX: 37.79 KG/M2 | DIASTOLIC BLOOD PRESSURE: 80 MMHG | WEIGHT: 240.8 LBS | TEMPERATURE: 97.8 F | HEIGHT: 67 IN

## 2020-10-28 DIAGNOSIS — K58.2 IRRITABLE BOWEL SYNDROME WITH BOTH CONSTIPATION AND DIARRHEA: Primary | ICD-10-CM

## 2020-10-28 DIAGNOSIS — K21.9 GASTROESOPHAGEAL REFLUX DISEASE WITHOUT ESOPHAGITIS: ICD-10-CM

## 2020-10-28 PROCEDURE — 99213 OFFICE O/P EST LOW 20 MIN: CPT | Performed by: PHYSICIAN ASSISTANT

## 2020-11-20 ENCOUNTER — OFFICE VISIT (OUTPATIENT)
Dept: FAMILY MEDICINE CLINIC | Facility: CLINIC | Age: 20
End: 2020-11-20
Payer: COMMERCIAL

## 2020-11-20 VITALS
BODY MASS INDEX: 37.04 KG/M2 | WEIGHT: 236 LBS | DIASTOLIC BLOOD PRESSURE: 72 MMHG | TEMPERATURE: 97.7 F | SYSTOLIC BLOOD PRESSURE: 116 MMHG | HEIGHT: 67 IN | HEART RATE: 105 BPM | OXYGEN SATURATION: 99 %

## 2020-11-20 DIAGNOSIS — M54.41 ACUTE RIGHT-SIDED LOW BACK PAIN WITH RIGHT-SIDED SCIATICA: Primary | ICD-10-CM

## 2020-11-20 PROCEDURE — 1036F TOBACCO NON-USER: CPT | Performed by: NURSE PRACTITIONER

## 2020-11-20 PROCEDURE — 3008F BODY MASS INDEX DOCD: CPT | Performed by: NURSE PRACTITIONER

## 2020-11-20 PROCEDURE — 99213 OFFICE O/P EST LOW 20 MIN: CPT | Performed by: NURSE PRACTITIONER

## 2020-11-20 RX ORDER — METHOCARBAMOL 500 MG/1
500 TABLET, FILM COATED ORAL 3 TIMES DAILY
Qty: 30 TABLET | Refills: 0 | Status: SHIPPED | OUTPATIENT
Start: 2020-11-20 | End: 2020-12-28 | Stop reason: ALTCHOICE

## 2020-11-20 RX ORDER — METHYLPREDNISOLONE 4 MG/1
TABLET ORAL
Qty: 21 EACH | Refills: 0 | Status: SHIPPED | OUTPATIENT
Start: 2020-11-20 | End: 2020-12-28 | Stop reason: ALTCHOICE

## 2020-11-23 ENCOUNTER — TELEPHONE (OUTPATIENT)
Dept: FAMILY MEDICINE CLINIC | Facility: CLINIC | Age: 20
End: 2020-11-23

## 2020-11-23 DIAGNOSIS — M54.41 ACUTE RIGHT-SIDED LOW BACK PAIN WITH RIGHT-SIDED SCIATICA: Primary | ICD-10-CM

## 2020-11-23 RX ORDER — MELOXICAM 15 MG/1
15 TABLET ORAL DAILY
Qty: 30 TABLET | Refills: 0 | Status: SHIPPED | OUTPATIENT
Start: 2020-11-23 | End: 2020-12-28 | Stop reason: ALTCHOICE

## 2020-11-24 ENCOUNTER — LAB (OUTPATIENT)
Dept: LAB | Facility: HOSPITAL | Age: 20
End: 2020-11-24
Payer: COMMERCIAL

## 2020-11-24 DIAGNOSIS — R19.7 DIARRHEA, UNSPECIFIED TYPE: ICD-10-CM

## 2020-11-24 DIAGNOSIS — Z00.00 HEALTHCARE MAINTENANCE: ICD-10-CM

## 2020-11-24 DIAGNOSIS — M54.41 ACUTE RIGHT-SIDED LOW BACK PAIN WITH RIGHT-SIDED SCIATICA: ICD-10-CM

## 2020-11-24 DIAGNOSIS — Z11.4 ENCOUNTER FOR SCREENING FOR HIV: ICD-10-CM

## 2020-11-24 LAB
ALBUMIN SERPL BCP-MCNC: 3.9 G/DL (ref 3.5–5)
ALP SERPL-CCNC: 50 U/L (ref 46–116)
ALT SERPL W P-5'-P-CCNC: 23 U/L (ref 12–78)
ANION GAP SERPL CALCULATED.3IONS-SCNC: 12 MMOL/L (ref 4–13)
AST SERPL W P-5'-P-CCNC: 9 U/L (ref 5–45)
BASOPHILS # BLD AUTO: 0.04 THOUSANDS/ΜL (ref 0–0.1)
BASOPHILS NFR BLD AUTO: 0 % (ref 0–1)
BILIRUB SERPL-MCNC: 0.6 MG/DL (ref 0.2–1)
BUN SERPL-MCNC: 12 MG/DL (ref 5–25)
CALCIUM SERPL-MCNC: 9.7 MG/DL (ref 8.3–10.1)
CHLORIDE SERPL-SCNC: 103 MMOL/L (ref 100–108)
CHOLEST SERPL-MCNC: 213 MG/DL (ref 50–200)
CO2 SERPL-SCNC: 25 MMOL/L (ref 21–32)
CREAT SERPL-MCNC: 0.63 MG/DL (ref 0.6–1.3)
EOSINOPHIL # BLD AUTO: 0.03 THOUSAND/ΜL (ref 0–0.61)
EOSINOPHIL NFR BLD AUTO: 0 % (ref 0–6)
ERYTHROCYTE [DISTWIDTH] IN BLOOD BY AUTOMATED COUNT: 12.7 % (ref 11.6–15.1)
GFR SERPL CREATININE-BSD FRML MDRD: 129 ML/MIN/1.73SQ M
GLUCOSE P FAST SERPL-MCNC: 76 MG/DL (ref 65–99)
HCT VFR BLD AUTO: 41.1 % (ref 34.8–46.1)
HDLC SERPL-MCNC: 68 MG/DL
HGB BLD-MCNC: 13.7 G/DL (ref 11.5–15.4)
IMM GRANULOCYTES # BLD AUTO: 0.08 THOUSAND/UL (ref 0–0.2)
IMM GRANULOCYTES NFR BLD AUTO: 1 % (ref 0–2)
LDLC SERPL CALC-MCNC: 128 MG/DL (ref 0–100)
LYMPHOCYTES # BLD AUTO: 2.43 THOUSANDS/ΜL (ref 0.6–4.47)
LYMPHOCYTES NFR BLD AUTO: 21 % (ref 14–44)
MCH RBC QN AUTO: 30.7 PG (ref 26.8–34.3)
MCHC RBC AUTO-ENTMCNC: 33.3 G/DL (ref 31.4–37.4)
MCV RBC AUTO: 92 FL (ref 82–98)
MONOCYTES # BLD AUTO: 0.89 THOUSAND/ΜL (ref 0.17–1.22)
MONOCYTES NFR BLD AUTO: 8 % (ref 4–12)
NEUTROPHILS # BLD AUTO: 7.92 THOUSANDS/ΜL (ref 1.85–7.62)
NEUTS SEG NFR BLD AUTO: 70 % (ref 43–75)
NONHDLC SERPL-MCNC: 145 MG/DL
NRBC BLD AUTO-RTO: 0 /100 WBCS
PLATELET # BLD AUTO: 317 THOUSANDS/UL (ref 149–390)
PMV BLD AUTO: 10.9 FL (ref 8.9–12.7)
POTASSIUM SERPL-SCNC: 4.1 MMOL/L (ref 3.5–5.3)
PROT SERPL-MCNC: 8 G/DL (ref 6.4–8.2)
RBC # BLD AUTO: 4.46 MILLION/UL (ref 3.81–5.12)
RHEUMATOID FACT SER QL LA: NEGATIVE
SODIUM SERPL-SCNC: 140 MMOL/L (ref 136–145)
TRIGL SERPL-MCNC: 84 MG/DL
TSH SERPL DL<=0.05 MIU/L-ACNC: 1.16 UIU/ML (ref 0.36–3.74)
WBC # BLD AUTO: 11.39 THOUSAND/UL (ref 4.31–10.16)

## 2020-11-24 PROCEDURE — 86038 ANTINUCLEAR ANTIBODIES: CPT

## 2020-11-24 PROCEDURE — 87389 HIV-1 AG W/HIV-1&-2 AB AG IA: CPT

## 2020-11-24 PROCEDURE — 80053 COMPREHEN METABOLIC PANEL: CPT

## 2020-11-24 PROCEDURE — 86430 RHEUMATOID FACTOR TEST QUAL: CPT

## 2020-11-24 PROCEDURE — 81374 HLA I TYPING 1 ANTIGEN LR: CPT

## 2020-11-24 PROCEDURE — 83516 IMMUNOASSAY NONANTIBODY: CPT

## 2020-11-24 PROCEDURE — 82784 ASSAY IGA/IGD/IGG/IGM EACH: CPT

## 2020-11-24 PROCEDURE — 84443 ASSAY THYROID STIM HORMONE: CPT

## 2020-11-24 PROCEDURE — 36415 COLL VENOUS BLD VENIPUNCTURE: CPT

## 2020-11-24 PROCEDURE — 86255 FLUORESCENT ANTIBODY SCREEN: CPT

## 2020-11-24 PROCEDURE — 86618 LYME DISEASE ANTIBODY: CPT

## 2020-11-24 PROCEDURE — 85025 COMPLETE CBC W/AUTO DIFF WBC: CPT

## 2020-11-24 PROCEDURE — 80061 LIPID PANEL: CPT

## 2020-11-25 ENCOUNTER — TELEPHONE (OUTPATIENT)
Dept: FAMILY MEDICINE CLINIC | Facility: CLINIC | Age: 20
End: 2020-11-25

## 2020-11-25 DIAGNOSIS — D72.829 LEUKOCYTOSIS, UNSPECIFIED TYPE: Primary | ICD-10-CM

## 2020-11-25 LAB
B BURGDOR IGG+IGM SER-ACNC: 27
ENDOMYSIUM IGA SER QL: NEGATIVE
GLIADIN PEPTIDE IGA SER-ACNC: 3 UNITS (ref 0–19)
GLIADIN PEPTIDE IGG SER-ACNC: 3 UNITS (ref 0–19)
HIV 1+2 AB+HIV1 P24 AG SERPL QL IA: NORMAL
IGA SERPL-MCNC: 221 MG/DL (ref 87–352)
RYE IGE QN: NEGATIVE
TTG IGA SER-ACNC: <2 U/ML (ref 0–3)
TTG IGG SER-ACNC: <2 U/ML (ref 0–5)

## 2020-12-01 LAB — HLA-B27 QL NAA+PROBE: NEGATIVE

## 2020-12-02 ENCOUNTER — TELEPHONE (OUTPATIENT)
Dept: FAMILY MEDICINE CLINIC | Facility: CLINIC | Age: 20
End: 2020-12-02

## 2020-12-02 DIAGNOSIS — M54.41 ACUTE RIGHT-SIDED LOW BACK PAIN WITH RIGHT-SIDED SCIATICA: Primary | ICD-10-CM

## 2020-12-14 DIAGNOSIS — M54.16 ACUTE LUMBAR RADICULOPATHY: Primary | ICD-10-CM

## 2020-12-28 ENCOUNTER — OFFICE VISIT (OUTPATIENT)
Dept: FAMILY MEDICINE CLINIC | Facility: CLINIC | Age: 20
End: 2020-12-28
Payer: COMMERCIAL

## 2020-12-28 VITALS
SYSTOLIC BLOOD PRESSURE: 126 MMHG | HEART RATE: 97 BPM | HEIGHT: 67 IN | TEMPERATURE: 97 F | BODY MASS INDEX: 37.35 KG/M2 | RESPIRATION RATE: 18 BRPM | WEIGHT: 238 LBS | OXYGEN SATURATION: 98 % | DIASTOLIC BLOOD PRESSURE: 76 MMHG

## 2020-12-28 DIAGNOSIS — F41.9 ANXIETY AND DEPRESSION: ICD-10-CM

## 2020-12-28 DIAGNOSIS — F51.01 PRIMARY INSOMNIA: Primary | ICD-10-CM

## 2020-12-28 DIAGNOSIS — F32.A ANXIETY AND DEPRESSION: ICD-10-CM

## 2020-12-28 PROCEDURE — 3008F BODY MASS INDEX DOCD: CPT | Performed by: NURSE PRACTITIONER

## 2020-12-28 PROCEDURE — 99214 OFFICE O/P EST MOD 30 MIN: CPT | Performed by: NURSE PRACTITIONER

## 2020-12-28 PROCEDURE — 1036F TOBACCO NON-USER: CPT | Performed by: NURSE PRACTITIONER

## 2020-12-28 RX ORDER — ESCITALOPRAM OXALATE 10 MG/1
10 TABLET ORAL DAILY
Qty: 90 TABLET | Refills: 0 | Status: SHIPPED | OUTPATIENT
Start: 2020-12-28 | End: 2021-04-24 | Stop reason: SDUPTHER

## 2020-12-28 RX ORDER — TRAZODONE HYDROCHLORIDE 50 MG/1
50 TABLET ORAL
Qty: 30 TABLET | Refills: 0 | Status: SHIPPED | OUTPATIENT
Start: 2020-12-28 | End: 2021-01-26 | Stop reason: SDUPTHER

## 2020-12-28 RX ORDER — BUSPIRONE HYDROCHLORIDE 5 MG/1
5 TABLET ORAL 2 TIMES DAILY
Qty: 60 TABLET | Refills: 1 | Status: SHIPPED | OUTPATIENT
Start: 2020-12-28 | End: 2021-06-21 | Stop reason: SDUPTHER

## 2021-01-26 DIAGNOSIS — F51.01 PRIMARY INSOMNIA: ICD-10-CM

## 2021-01-27 RX ORDER — TRAZODONE HYDROCHLORIDE 50 MG/1
50 TABLET ORAL
Qty: 30 TABLET | Refills: 0 | Status: SHIPPED | OUTPATIENT
Start: 2021-01-27 | End: 2021-02-25 | Stop reason: SDUPTHER

## 2021-02-25 DIAGNOSIS — F51.01 PRIMARY INSOMNIA: ICD-10-CM

## 2021-02-25 RX ORDER — TRAZODONE HYDROCHLORIDE 50 MG/1
50 TABLET ORAL
Qty: 30 TABLET | Refills: 0 | Status: SHIPPED | OUTPATIENT
Start: 2021-02-25 | End: 2021-03-27 | Stop reason: SDUPTHER

## 2021-03-04 ENCOUNTER — TELEMEDICINE (OUTPATIENT)
Dept: FAMILY MEDICINE CLINIC | Facility: CLINIC | Age: 21
End: 2021-03-04
Payer: COMMERCIAL

## 2021-03-04 VITALS — TEMPERATURE: 98.2 F | WEIGHT: 235 LBS | BODY MASS INDEX: 36.81 KG/M2

## 2021-03-04 DIAGNOSIS — H00.015 HORDEOLUM EXTERNUM OF LEFT LOWER EYELID: Primary | ICD-10-CM

## 2021-03-04 PROCEDURE — 99213 OFFICE O/P EST LOW 20 MIN: CPT | Performed by: NURSE PRACTITIONER

## 2021-03-04 RX ORDER — BACITRACIN 500 [USP'U]/G
OINTMENT OPHTHALMIC EVERY 4 HOURS
Qty: 3.5 G | Refills: 0 | Status: SHIPPED | OUTPATIENT
Start: 2021-03-04 | End: 2021-03-09

## 2021-03-04 NOTE — PROGRESS NOTES
Virtual Regular Visit      Assessment/Plan:    Problem List Items Addressed This Visit        Other    Hordeolum externum of left lower eyelid - Primary     To begin bacitracin  Counseled on the importance of applying a warm compress and changing all eye makeup  To follow-up if no improvement in 1 week or sooner if symptoms worsen  To ER for severe eye pain or change in vision  Relevant Medications    bacitracin ophthalmic ointment               Reason for visit is   Chief Complaint   Patient presents with    Virtual Regular Visit        Encounter provider Hedda Merlin, [facility]     Provider located at [facility] [address] [place]  [address] [place] [id number] [phone]      Recent Visits  No visits were found meeting these conditions  Showing recent visits within past 7 days and meeting all other requirements     Today's Visits  Date Type Provider Dept   03/04/21 Telemedicine Hedda Merlin, CRNP [address] today's visits and meeting all other requirements     Future Appointments  No visits were found meeting these conditions  Showing future appointments within next 150 days and meeting all other requirements        The patient was identified by name and date of birth  Baltazar Chahal was informed that this is a telemedicine visit and that the visit is being conducted through [employer] and patient was informed that this is not a secure, HIPAA-compliant platform  She agrees to proceed     My office door was closed  No one else was in the room  She acknowledged consent and understanding of privacy and security of the video platform  The patient has agreed to participate and understands they can discontinue the visit at any time  Patient is aware this is a billable service  Subjective  Baltazar Chahal is a 23 y o  female            [facility] presents reporting left eye redness and discomfort that started this morning  She also noted some discharge  Denies deep eye pain, fever, trauma to eye, or recent URI symptoms  Nothing has been used to treat her symptoms  Past Medical History:   Diagnosis Date    Migraine        Past Surgical History:   Procedure Laterality Date    ANTERIOR CRUCIATE LIGAMENT REPAIR  2016       Current Outpatient Medications   Medication Sig Dispense Refill    bacitracin ophthalmic ointment Administer into the left eye every 4 (four) hours for 5 days 3 5 g 0    busPIRone (BUSPAR) 5 mg tablet Take 1 tablet (5 mg total) by mouth 2 (two) times a day 60 tablet 1    escitalopram (LEXAPRO) 10 mg tablet Take 1 tablet (10 mg total) by mouth daily 90 tablet 0    ondansetron (ZOFRAN-ODT) 4 mg disintegrating tablet Take 1 tablet (4 mg total) by mouth every 6 (six) hours as needed for nausea or vomiting 20 tablet 0    traZODone (DESYREL) 50 mg tablet Take 1 tablet (50 mg total) by mouth daily at bedtime as needed for sleep 30 tablet 0     No current facility-administered medications for this visit  Allergies   Allergen Reactions    Nickel        Review of Systems   Constitutional: Negative  HENT: Negative  Eyes: Positive for discharge and redness  Negative for photophobia, pain, itching and visual disturbance  Respiratory: Negative  Cardiovascular: Negative  Neurological: Negative  Psychiatric/Behavioral: Negative  Video Exam    Vitals:    03/04/21 1439   Temp: 98 2 °F (36 8 °C)   Weight: 107 kg (235 lb)       Physical Exam  Vitals signs and nursing note reviewed  Constitutional:       Appearance: Normal appearance  HENT:      Head: Normocephalic and atraumatic  Eyes:     Neck:      Musculoskeletal: Neck supple  Pulmonary:      Effort: Pulmonary effort is normal    Neurological:      General: No focal deficit present  Mental Status: She is alert and oriented to person, place, and time     Psychiatric: Mood and Affect: Mood normal          Behavior: Behavior normal          Thought Content: Thought content normal          Judgment: Judgment normal           I spent 10 minutes directly with the patient during this visit      VIRTUAL VISIT DISCLAIMER    Josselyn Sanchez acknowledges that she has consented to an online visit or consultation  She understands that the online visit is based solely on information provided by her, and that, in the absence of a face-to-face physical evaluation by the physician, the diagnosis she receives is both limited and provisional in terms of accuracy and completeness  This is not intended to replace a full medical face-to-face evaluation by the physician  Josselyn Sanchez understands and accepts these terms

## 2021-03-27 DIAGNOSIS — F51.01 PRIMARY INSOMNIA: ICD-10-CM

## 2021-03-29 RX ORDER — TRAZODONE HYDROCHLORIDE 50 MG/1
50 TABLET ORAL
Qty: 30 TABLET | Refills: 0 | Status: SHIPPED | OUTPATIENT
Start: 2021-03-29 | End: 2021-05-04 | Stop reason: SDUPTHER

## 2021-04-05 ENCOUNTER — OFFICE VISIT (OUTPATIENT)
Dept: FAMILY MEDICINE CLINIC | Facility: CLINIC | Age: 21
End: 2021-04-05
Payer: COMMERCIAL

## 2021-04-05 VITALS
TEMPERATURE: 97.5 F | BODY MASS INDEX: 34.84 KG/M2 | HEIGHT: 67 IN | WEIGHT: 222 LBS | DIASTOLIC BLOOD PRESSURE: 82 MMHG | OXYGEN SATURATION: 98 % | RESPIRATION RATE: 18 BRPM | HEART RATE: 82 BPM | SYSTOLIC BLOOD PRESSURE: 140 MMHG

## 2021-04-05 DIAGNOSIS — F32.A ANXIETY AND DEPRESSION: ICD-10-CM

## 2021-04-05 DIAGNOSIS — F51.01 PRIMARY INSOMNIA: ICD-10-CM

## 2021-04-05 DIAGNOSIS — F41.9 ANXIETY AND DEPRESSION: ICD-10-CM

## 2021-04-05 DIAGNOSIS — Z12.4 CERVICAL CANCER SCREENING: Primary | ICD-10-CM

## 2021-04-05 PROCEDURE — 1036F TOBACCO NON-USER: CPT | Performed by: NURSE PRACTITIONER

## 2021-04-05 PROCEDURE — 3008F BODY MASS INDEX DOCD: CPT | Performed by: NURSE PRACTITIONER

## 2021-04-05 PROCEDURE — 99214 OFFICE O/P EST MOD 30 MIN: CPT | Performed by: NURSE PRACTITIONER

## 2021-04-05 NOTE — PROGRESS NOTES
BMI Counseling: Body mass index is 34 77 kg/m²  The BMI is above normal  Nutrition recommendations include decreasing portion sizes, encouraging healthy choices of fruits and vegetables, decreasing fast food intake, consuming healthier snacks, limiting drinks that contain sugar, moderation in carbohydrate intake, increasing intake of lean protein, reducing intake of saturated and trans fat and reducing intake of cholesterol  Exercise recommendations include moderate physical activity 150 minutes/week, vigorous physical activity 75 minutes/week and exercising 3-5 times per week  No pharmacotherapy was ordered  Assessment/Plan:     Chronic Problems:  Anxiety and depression    Continue Lexapro  Advised to take BuSpar twice daily  Primary insomnia    Continue trazodone as needed  BMI 34 0-34 9,adult  Great job with weight loss  Continue healthy diet and exercise  Visit Diagnosis:  Diagnoses and all orders for this visit:    Cervical cancer screening  -     Ambulatory referral to Obstetrics / Gynecology; Future    Anxiety and depression    Primary insomnia    BMI 34 0-34 9,adult          Subjective:    Patient ID: Dee Cosme is a 23 y o  female  Patient presents for 3 month follow-up on anxiety and depression  Patient states that she is doing great with Lexapro as far as depression  Patient is currently in her last month of college has increased stress recently  Patient does feel increase in anxiety  She takes BuSpar as needed  She is sleeping well with trazodone as needed  The following portions of the patient's history were reviewed and updated as appropriate: allergies, current medications, past family history, past medical history, past social history, past surgical history and problem list     Review of Systems   Constitutional: Negative for chills and fever  HENT: Negative for ear pain and sore throat  Eyes: Negative for pain and visual disturbance     Respiratory: Negative for cough and shortness of breath  Cardiovascular: Negative for chest pain and palpitations  Gastrointestinal: Negative for abdominal pain and vomiting  Genitourinary: Negative for dysuria and hematuria  Musculoskeletal: Negative for arthralgias and back pain  Skin: Negative for color change and rash  Neurological: Negative for seizures and syncope  Psychiatric/Behavioral: Negative for dysphoric mood and sleep disturbance  The patient is nervous/anxious  All other systems reviewed and are negative          /82   Pulse 82   Temp 97 5 °F (36 4 °C) (Tympanic)   Resp 18   Ht 5' 7" (1 702 m)   Wt 101 kg (222 lb)   LMP 04/03/2021 (Exact Date)   SpO2 98%   BMI 34 77 kg/m²   Social History     Socioeconomic History    Marital status: Single     Spouse name: Not on file    Number of children: Not on file    Years of education: Not on file    Highest education level: Not on file   Occupational History    Not on file   Social Needs    Financial resource strain: Not on file    Food insecurity     Worry: Not on file     Inability: Not on file    Transportation needs     Medical: Not on file     Non-medical: Not on file   Tobacco Use    Smoking status: Never Smoker    Smokeless tobacco: Never Used   Substance and Sexual Activity    Alcohol use: No    Drug use: No    Sexual activity: Yes     Partners: Male     Birth control/protection: OCP   Lifestyle    Physical activity     Days per week: Not on file     Minutes per session: Not on file    Stress: Not on file   Relationships    Social connections     Talks on phone: Not on file     Gets together: Not on file     Attends Nondenominational service: Not on file     Active member of club or organization: Not on file     Attends meetings of clubs or organizations: Not on file     Relationship status: Not on file    Intimate partner violence     Fear of current or ex partner: Not on file     Emotionally abused: Not on file Physically abused: Not on file     Forced sexual activity: Not on file   Other Topics Concern    Not on file   Social History Narrative    Not on file     Past Medical History:   Diagnosis Date    Migraine      Family History   Problem Relation Age of Onset    Breast cancer Mother 39    Hypertension Mother     Hypertension Father     Asthma Brother     Heart defect Brother      Past Surgical History:   Procedure Laterality Date    ANTERIOR CRUCIATE LIGAMENT REPAIR  2016       Current Outpatient Medications:     busPIRone (BUSPAR) 5 mg tablet, Take 1 tablet (5 mg total) by mouth 2 (two) times a day, Disp: 60 tablet, Rfl: 1    escitalopram (LEXAPRO) 10 mg tablet, Take 1 tablet (10 mg total) by mouth daily, Disp: 90 tablet, Rfl: 0    ondansetron (ZOFRAN-ODT) 4 mg disintegrating tablet, Take 1 tablet (4 mg total) by mouth every 6 (six) hours as needed for nausea or vomiting, Disp: 20 tablet, Rfl: 0    traZODone (DESYREL) 50 mg tablet, Take 1 tablet (50 mg total) by mouth daily at bedtime as needed for sleep, Disp: 30 tablet, Rfl: 0    Allergies   Allergen Reactions    Nickel           Lab Review   No visits with results within 2 Month(s) from this visit     Latest known visit with results is:   Lab on 11/24/2020   Component Date Value    HIV-1/HIV-2 Ab 11/24/2020 Non-Reactive     WBC 11/24/2020 11 39*    RBC 11/24/2020 4 46     Hemoglobin 11/24/2020 13 7     Hematocrit 11/24/2020 41 1     MCV 11/24/2020 92     MCH 11/24/2020 30 7     MCHC 11/24/2020 33 3     RDW 11/24/2020 12 7     MPV 11/24/2020 10 9     Platelets 17/84/0448 317     nRBC 11/24/2020 0     Neutrophils Relative 11/24/2020 70     Immat GRANS % 11/24/2020 1     Lymphocytes Relative 11/24/2020 21     Monocytes Relative 11/24/2020 8     Eosinophils Relative 11/24/2020 0     Basophils Relative 11/24/2020 0     Neutrophils Absolute 11/24/2020 7 92*    Immature Grans Absolute 11/24/2020 0 08     Lymphocytes Absolute 11/24/2020 2 43     Monocytes Absolute 11/24/2020 0 89     Eosinophils Absolute 11/24/2020 0 03     Basophils Absolute 11/24/2020 0 04     TSH 3RD GENERATON 11/24/2020 1 159     Sodium 11/24/2020 140     Potassium 11/24/2020 4 1     Chloride 11/24/2020 103     CO2 11/24/2020 25     ANION GAP 11/24/2020 12     BUN 11/24/2020 12     Creatinine 11/24/2020 0 63     Glucose, Fasting 11/24/2020 76     Calcium 11/24/2020 9 7     AST 11/24/2020 9     ALT 11/24/2020 23     Alkaline Phosphatase 11/24/2020 50     Total Protein 11/24/2020 8 0     Albumin 11/24/2020 3 9     Total Bilirubin 11/24/2020 0 60     eGFR 11/24/2020 129     Cholesterol 11/24/2020 213*    Triglycerides 11/24/2020 84     HDL, Direct 11/24/2020 68     LDL Calculated 11/24/2020 128*    Non-HDL-Chol (CHOL-HDL) 11/24/2020 145     Lyme total antibody 11/24/2020 27     MARCELLE 11/24/2020 Negative     Rheumatoid Factor 11/24/2020 Negative     HLA B27 11/24/2020 Negative     IgA 11/24/2020 221     Gliadin IgA 11/24/2020 3     Gliadin IgG 11/24/2020 3     Tissue Transglut Ab IGG 11/24/2020 <2     TISSUE TRANSGLUTAMINASE * 11/24/2020 <2     Endomysial IgA 11/24/2020 Negative         Imaging: No results found  Objective:     Physical Exam  Constitutional:       Appearance: She is well-developed  Cardiovascular:      Rate and Rhythm: Normal rate and regular rhythm  Heart sounds: Normal heart sounds  No murmur  Pulmonary:      Effort: Pulmonary effort is normal  No respiratory distress  Breath sounds: Normal breath sounds  Skin:     General: Skin is warm and dry  Neurological:      Mental Status: She is alert and oriented to person, place, and time  There are no Patient Instructions on file for this visit  LEONILA Thurston    Portions of the record may have been created with voice recognition software    Occasional wrong word or "sound a like" substitutions may have occurred due to the inherent limitations of voice recognition software  Read the chart carefully and recognize, using context, where substitutions have occurred

## 2021-04-13 DIAGNOSIS — Z23 ENCOUNTER FOR IMMUNIZATION: ICD-10-CM

## 2021-04-24 DIAGNOSIS — F41.9 ANXIETY AND DEPRESSION: ICD-10-CM

## 2021-04-24 DIAGNOSIS — F32.A ANXIETY AND DEPRESSION: ICD-10-CM

## 2021-04-26 RX ORDER — ESCITALOPRAM OXALATE 10 MG/1
10 TABLET ORAL DAILY
Qty: 90 TABLET | Refills: 1 | Status: SHIPPED | OUTPATIENT
Start: 2021-04-26 | End: 2021-06-29 | Stop reason: SDUPTHER

## 2021-04-29 DIAGNOSIS — F51.01 PRIMARY INSOMNIA: ICD-10-CM

## 2021-05-04 DIAGNOSIS — F51.01 PRIMARY INSOMNIA: ICD-10-CM

## 2021-05-12 RX ORDER — TRAZODONE HYDROCHLORIDE 50 MG/1
50 TABLET ORAL
Qty: 90 TABLET | Refills: 0 | Status: SHIPPED | OUTPATIENT
Start: 2021-05-12 | End: 2021-10-08 | Stop reason: SDUPTHER

## 2021-05-13 RX ORDER — TRAZODONE HYDROCHLORIDE 50 MG/1
50 TABLET ORAL
Qty: 30 TABLET | Refills: 0 | Status: SHIPPED | OUTPATIENT
Start: 2021-05-13 | End: 2021-10-08 | Stop reason: ALTCHOICE

## 2021-05-24 ENCOUNTER — TELEPHONE (OUTPATIENT)
Dept: GASTROENTEROLOGY | Facility: CLINIC | Age: 21
End: 2021-05-24

## 2021-05-24 NOTE — TELEPHONE ENCOUNTER
Aleksandra - patient left msg in [facility]   Called patient lmom for patient to call office to schedule OV with Rahat De La Torre

## 2021-05-25 ENCOUNTER — TELEPHONE (OUTPATIENT)
Dept: GASTROENTEROLOGY | Facility: CLINIC | Age: 21
End: 2021-05-25

## 2021-05-25 NOTE — TELEPHONE ENCOUNTER
Aleksandra patient - LMOM to RTRN call to [phone] option 1 to schedule appt for GERD issues   Deejayx

## 2021-06-02 ENCOUNTER — OFFICE VISIT (OUTPATIENT)
Dept: GASTROENTEROLOGY | Facility: CLINIC | Age: 21
End: 2021-06-02
Payer: COMMERCIAL

## 2021-06-02 VITALS
DIASTOLIC BLOOD PRESSURE: 88 MMHG | HEIGHT: 67 IN | SYSTOLIC BLOOD PRESSURE: 130 MMHG | WEIGHT: 216.6 LBS | BODY MASS INDEX: 34 KG/M2 | HEART RATE: 72 BPM

## 2021-06-02 DIAGNOSIS — K21.9 GASTROESOPHAGEAL REFLUX DISEASE WITHOUT ESOPHAGITIS: Primary | ICD-10-CM

## 2021-06-02 PROCEDURE — 99213 OFFICE O/P EST LOW 20 MIN: CPT | Performed by: PHYSICIAN ASSISTANT

## 2021-06-02 PROCEDURE — 3008F BODY MASS INDEX DOCD: CPT | Performed by: PHYSICIAN ASSISTANT

## 2021-06-02 PROCEDURE — 1036F TOBACCO NON-USER: CPT | Performed by: PHYSICIAN ASSISTANT

## 2021-06-02 RX ORDER — PANTOPRAZOLE SODIUM 40 MG/1
40 TABLET, DELAYED RELEASE ORAL DAILY
Qty: 30 TABLET | Refills: 3 | Status: SHIPPED | OUTPATIENT
Start: 2021-06-02 | End: 2022-03-31 | Stop reason: SDUPTHER

## 2021-06-02 NOTE — PATIENT INSTRUCTIONS
Gastroesophageal Reflux Disease   WHAT YOU NEED TO KNOW:   Gastroesophageal reflux disease (GERD) is reflux that occurs more than twice a week for a few weeks  Reflux means acid and food in the stomach back up into the esophagus  It usually causes heartburn and other symptoms  GERD can cause other health problems over time if it is not treated  DISCHARGE INSTRUCTIONS:   Call your local emergency number (911 in the [address] [facility]) if:   · You have severe chest pain and sudden trouble breathing  Return to the emergency department if:   · You have trouble breathing after you vomit  · You have trouble swallowing, or pain with swallowing  · Your bowel movements are black, bloody, or tarry-looking  · Your vomit looks like coffee grounds or has blood in it  Call your doctor or gastroenterologist if:   · You feel full and cannot burp or vomit  · You vomit large amounts, or you vomit often  · You are losing weight without trying  · Your symptoms get worse or do not improve with treatment  · You have questions or concerns about your condition or care  Medicines:   · Medicines  are used to decrease stomach acid  Medicine may also be used to help your lower esophageal sphincter and stomach contract (tighten) more  · Take your medicine as directed  Contact your healthcare provider if you think your medicine is not helping or if you have side effects  Tell him of her if you are allergic to any medicine  Keep a list of the medicines, vitamins, and herbs you take  Include the amounts, and when and why you take them  Bring the list or the pill bottles to follow-up visits  Carry your medicine list with you in case of an emergency  Manage GERD:   · Do not have foods or drinks that may increase heartburn  These include chocolate, peppermint, fried or fatty foods, drinks that contain caffeine, or carbonated drinks (soda)  Other foods include spicy foods, onions, tomatoes, and tomato-based foods   Do not have foods or drinks that can irritate your esophagus, such as citrus fruits, juices, and alcohol  · Do not eat large meals  When you eat a lot of food at one time, your stomach needs more acid to digest it  Eat 6 small meals each day instead of 3 large ones, and eat slowly  Do not eat meals 2 to 3 hours before bedtime  · Elevate the head of your bed  Place 6-inch blocks under the head of your bed frame  You may also use more than one pillow under your head and shoulders while you sleep  · Maintain a healthy weight  If you are overweight, weight loss may help relieve symptoms of GERD  · Do not smoke  Smoking weakens the lower esophageal sphincter and increases the risk of GERD  Ask your healthcare provider for information if you currently smoke and need help to quit  E-cigarettes or smokeless tobacco still contain nicotine  Talk to your healthcare provider before you use these products  · Do not wear clothing that is tight around your waist   Tight clothing can put pressure on your stomach and cause or worsen GERD symptoms  Follow up with your doctor or gastroenterologist as directed:  Write down your questions so you remember to ask them during your visits  © Copyright [address] [place] [address] Drive Information is for End User's use only and may not be sold, redistributed or otherwise used for commercial purposes  All illustrations and images included in CareNotes® are the copyrighted property of A D A M , Inc  or [facility] Zeina Cummings   The above information is an  only  It is not intended as medical advice for individual conditions or treatments  Talk to your doctor, nurse or pharmacist before following any medical regimen to see if it is safe and effective for you

## 2021-06-02 NOTE — PROGRESS NOTES
[facility] - Outpatient Follow-up Note  Dee Cosme 23 y o  female MRN: [id number]  Encounter: [id number]          ASSESSMENT AND PLAN:      1  Gastroesophageal reflux disease without esophagitis  -Will restart pantoprazole 40 mg daily for 3 months     -Will hold off on repeat for off be at this time  -GERD dietary triggers reviewed  -Patient will call the office with any alarm symptom  ______________________________________________________________________    SUBJECTIVE:   25-year-old female presents to the office today with chief complaint of acid reflux  Patient was initially evaluated in the fall of 2020 she did undergo an upper endoscopy as well as colonoscopy  Patient's upper endoscopy was a normal examination  Patient completed a 30 day course of pantoprazole and she was feeling great  She reports for the past several weeks she has been having worsening episodes of nausea as well as regurgitation  Patient reports that about 5 times a week she feels like she is going to vomit  She denies any alarm symptoms such as dysphagia, hematemesis, melena  She reports that she does know what her dietary triggers are and she avoids and all cost   Patient has been off PPI therapy for quite some time now  Patient will utilize Pepcid as needed  REVIEW OF SYSTEMS IS OTHERWISE NEGATIVE        Historical Information   Past Medical History:   Diagnosis Date    Migraine      Past Surgical History:   Procedure Laterality Date    ANTERIOR CRUCIATE LIGAMENT REPAIR  2016     Social History   Social History     Substance and Sexual Activity   Alcohol Use No     Social History     Substance and Sexual Activity   Drug Use No     Social History     Tobacco Use   Smoking Status Never Smoker   Smokeless Tobacco Never Used     Family History   Problem Relation Age of Onset    Breast cancer Mother 39    Hypertension Mother     Hypertension Father     Asthma Brother     Heart defect Brother        Meds/Allergies       Current Outpatient Medications:     busPIRone (BUSPAR) 5 mg tablet    escitalopram (LEXAPRO) 10 mg tablet    ondansetron (ZOFRAN-ODT) 4 mg disintegrating tablet    traZODone (DESYREL) 50 mg tablet    pantoprazole (PROTONIX) 40 mg tablet    traZODone (DESYREL) 50 mg tablet    Allergies   Allergen Reactions    Nickel            Objective     Blood pressure 130/88, pulse 72, height 5' 7" (1 702 m), weight 98 2 kg (216 lb 9 6 oz), not currently breastfeeding  Body mass index is 33 92 kg/m²  PHYSICAL EXAM:      General Appearance:   Alert, cooperative, no distress   HEENT:   Normocephalic, atraumatic, anicteric      Neck:  Supple, symmetrical, trachea midline   Lungs:   Clear to auscultation bilaterally; no rales, rhonchi or wheezing; respirations unlabored    Heart[de-identified]   Regular rate and rhythm; no murmur, rub, or gallop  Abdomen:   Soft, non-tender, non-distended; normal bowel sounds; no masses, no organomegaly    Genitalia:   Deferred    Rectal:   Deferred    Extremities:  No cyanosis, clubbing or edema    Pulses:  2+ and symmetric    Skin:  No jaundice, rashes, or lesions    Lymph nodes:  No palpable cervical lymphadenopathy        Lab Results:   No visits with results within 1 Day(s) from this visit     Latest known visit with results is:   Lab on 11/24/2020   Component Date Value    HIV-1/HIV-2 Ab 11/24/2020 Non-Reactive     WBC 11/24/2020 11 39*    RBC 11/24/2020 4 46     Hemoglobin 11/24/2020 13 7     Hematocrit 11/24/2020 41 1     MCV 11/24/2020 92     MCH 11/24/2020 30 7     MCHC 11/24/2020 33 3     RDW 11/24/2020 12 7     MPV 11/24/2020 10 9     Platelets 58/41/3959 317     nRBC 11/24/2020 0     Neutrophils Relative 11/24/2020 70     Immat GRANS % 11/24/2020 1     Lymphocytes Relative 11/24/2020 21     Monocytes Relative 11/24/2020 8     Eosinophils Relative 11/24/2020 0     Basophils Relative 11/24/2020 0     Neutrophils Absolute 11/24/2020 7 92*    Immature Grans Absolute 11/24/2020 0 08     Lymphocytes Absolute 11/24/2020 2 43     Monocytes Absolute 11/24/2020 0 89     Eosinophils Absolute 11/24/2020 0 03     Basophils Absolute 11/24/2020 0 04     TSH 3RD GENERATON 11/24/2020 1 159     Sodium 11/24/2020 140     Potassium 11/24/2020 4 1     Chloride 11/24/2020 103     CO2 11/24/2020 25     ANION GAP 11/24/2020 12     BUN 11/24/2020 12     Creatinine 11/24/2020 0 63     Glucose, Fasting 11/24/2020 76     Calcium 11/24/2020 9 7     AST 11/24/2020 9     ALT 11/24/2020 23     Alkaline Phosphatase 11/24/2020 50     Total Protein 11/24/2020 8 0     Albumin 11/24/2020 3 9     Total Bilirubin 11/24/2020 0 60     eGFR 11/24/2020 129     Cholesterol 11/24/2020 213*    Triglycerides 11/24/2020 84     HDL, Direct 11/24/2020 68     LDL Calculated 11/24/2020 128*    Non-HDL-Chol (CHOL-HDL) 11/24/2020 145     Lyme total antibody 11/24/2020 27     MARCELLE 11/24/2020 Negative     Rheumatoid Factor 11/24/2020 Negative     HLA B27 11/24/2020 Negative     IgA 11/24/2020 221     Gliadin IgA 11/24/2020 3     Gliadin IgG 11/24/2020 3     Tissue Transglut Ab IGG 11/24/2020 <2     TISSUE TRANSGLUTAMINASE * 11/24/2020 <2     Endomysial IgA 11/24/2020 Negative          Radiology Results:   No results found

## 2021-06-21 DIAGNOSIS — F32.A ANXIETY AND DEPRESSION: ICD-10-CM

## 2021-06-21 DIAGNOSIS — F41.9 ANXIETY AND DEPRESSION: ICD-10-CM

## 2021-06-22 RX ORDER — BUSPIRONE HYDROCHLORIDE 5 MG/1
5 TABLET ORAL 2 TIMES DAILY
Qty: 60 TABLET | Refills: 0 | Status: SHIPPED | OUTPATIENT
Start: 2021-06-22 | End: 2021-10-08 | Stop reason: ALTCHOICE

## 2021-06-29 DIAGNOSIS — F41.9 ANXIETY AND DEPRESSION: ICD-10-CM

## 2021-06-29 DIAGNOSIS — F32.A ANXIETY AND DEPRESSION: ICD-10-CM

## 2021-07-03 RX ORDER — ESCITALOPRAM OXALATE 10 MG/1
10 TABLET ORAL DAILY
Qty: 90 TABLET | Refills: 1 | Status: SHIPPED | OUTPATIENT
Start: 2021-07-03 | End: 2021-09-01 | Stop reason: DRUGHIGH

## 2021-07-27 ENCOUNTER — OFFICE VISIT (OUTPATIENT)
Dept: FAMILY MEDICINE CLINIC | Facility: CLINIC | Age: 21
End: 2021-07-27
Payer: COMMERCIAL

## 2021-07-27 VITALS
DIASTOLIC BLOOD PRESSURE: 83 MMHG | HEIGHT: 67 IN | HEART RATE: 98 BPM | OXYGEN SATURATION: 98 % | RESPIRATION RATE: 16 BRPM | WEIGHT: 208.6 LBS | TEMPERATURE: 98.9 F | BODY MASS INDEX: 32.74 KG/M2 | SYSTOLIC BLOOD PRESSURE: 124 MMHG

## 2021-07-27 DIAGNOSIS — K08.89 PAIN, DENTAL: Primary | ICD-10-CM

## 2021-07-27 PROCEDURE — 3725F SCREEN DEPRESSION PERFORMED: CPT | Performed by: FAMILY MEDICINE

## 2021-07-27 PROCEDURE — 99213 OFFICE O/P EST LOW 20 MIN: CPT | Performed by: FAMILY MEDICINE

## 2021-07-27 PROCEDURE — 3008F BODY MASS INDEX DOCD: CPT | Performed by: FAMILY MEDICINE

## 2021-07-27 PROCEDURE — 1036F TOBACCO NON-USER: CPT | Performed by: FAMILY MEDICINE

## 2021-07-27 NOTE — PROGRESS NOTES
Assessment/Plan:     Chronic Problems:  No problem-specific Assessment & Plan notes found for this encounter  Visit Diagnosis:  There are no diagnoses linked to this encounter  Subjective:    Patient ID: Sarah Ernst is a 23 y o  female  Dental pain   left side , lower rear  Neg fever chill ,  Denies any direct injury  Has been self treating with tylenol and salt water gargle    Has scheduled follow-up with dentist guzman        Dental Pain   This is a new problem  The current episode started in the past 7 days  The problem occurs constantly  The following portions of the patient's history were reviewed and updated as appropriate: allergies, current medications, past family history, past medical history, past social history, past surgical history and problem list     Review of Systems   HENT: Positive for dental problem  All other systems reviewed and are negative          /83   Pulse 98   Temp 98 9 °F (37 2 °C)   Resp 16   Ht 5' 7" (1 702 m)   Wt 94 6 kg (208 lb 9 6 oz)   SpO2 98%   BMI 32 67 kg/m²   Social History     Socioeconomic History    Marital status: Single     Spouse name: Not on file    Number of children: Not on file    Years of education: Not on file    Highest education level: Not on file   Occupational History    Not on file   Tobacco Use    Smoking status: Never Smoker    Smokeless tobacco: Never Used   Vaping Use    Vaping Use: Never used   Substance and Sexual Activity    Alcohol use: No    Drug use: No    Sexual activity: Yes     Partners: Male     Birth control/protection: OCP   Other Topics Concern    Not on file   Social History Narrative    Not on file     Social Determinants of Health     Financial Resource Strain:     Difficulty of Paying Living Expenses:    Food Insecurity:     Worried About Running Out of Food in the Last Year:     [address] in the Last Year:    Transportation Needs:     Lack of Transportation (Medical):  Lack of Transportation (Non-Medical):    Physical Activity:     Days of Exercise per Week:     Minutes of Exercise per Session:    Stress:     Feeling of Stress :    Social Connections:     Frequency of Communication with Friends and Family:     Frequency of Social Gatherings with Friends and Family:     Attends Denominational Services:     Active Member of Clubs or Organizations:     Attends Club or Organization Meetings:     Marital Status:    Intimate Partner Violence:     Fear of Current or Ex-Partner:     Emotionally Abused:     Physically Abused:     Sexually Abused:      Past Medical History:   Diagnosis Date    Migraine      Family History   Problem Relation Age of Onset    Breast cancer Mother 39    Hypertension Mother     Hypertension Father     Asthma Brother     Heart defect Brother      Past Surgical History:   Procedure Laterality Date    ANTERIOR CRUCIATE LIGAMENT REPAIR  2016       Current Outpatient Medications:     busPIRone (BUSPAR) 5 mg tablet, Take 1 tablet (5 mg total) by mouth 2 (two) times a day, Disp: 60 tablet, Rfl: 0    escitalopram (LEXAPRO) 10 mg tablet, Take 1 tablet (10 mg total) by mouth daily, Disp: 90 tablet, Rfl: 1    ondansetron (ZOFRAN-ODT) 4 mg disintegrating tablet, Take 1 tablet (4 mg total) by mouth every 6 (six) hours as needed for nausea or vomiting, Disp: 20 tablet, Rfl: 0    pantoprazole (PROTONIX) 40 mg tablet, Take 1 tablet (40 mg total) by mouth daily, Disp: 30 tablet, Rfl: 3    traZODone (DESYREL) 50 mg tablet, Take 1 tablet (50 mg total) by mouth daily at bedtime as needed for sleep, Disp: 30 tablet, Rfl: 0    traZODone (DESYREL) 50 mg tablet, Take 1 tablet (50 mg total) by mouth daily at bedtime as needed for sleep (Patient not taking: Reported on 6/2/2021), Disp: 90 tablet, Rfl: 0    Allergies   Allergen Reactions    Nickel           Lab Review   not applicable     Imaging: No results found      Objective:     Physical Exam  Constitutional:       General: She is not in acute distress  Appearance: She is not ill-appearing or toxic-appearing  HENT:      Head: Normocephalic and atraumatic  Mouth/Throat:      Mouth: Mucous membranes are dry  No oral lesions  Dentition: No dental tenderness, gingival swelling, dental abscesses or gum lesions  Tongue: No lesions  Palate: No lesions  Pharynx: No oropharyngeal exudate or posterior oropharyngeal erythema  Cardiovascular:      Rate and Rhythm: Normal rate  Pulmonary:      Effort: Pulmonary effort is normal    Musculoskeletal:      Cervical back: Normal range of motion  Lymphadenopathy:      Cervical: No cervical adenopathy  There are no Patient Instructions on file for this visit  LEONILA Vyas    Portions of the record may have been created with voice recognition software  Occasional wrong word or "sound a like" substitutions may have occurred due to the inherent limitations of voice recognition software  Read the chart carefully and recognize, using context, where substitutions have occurred

## 2021-09-01 ENCOUNTER — OFFICE VISIT (OUTPATIENT)
Dept: FAMILY MEDICINE CLINIC | Facility: CLINIC | Age: 21
End: 2021-09-01
Payer: COMMERCIAL

## 2021-09-01 VITALS
DIASTOLIC BLOOD PRESSURE: 78 MMHG | BODY MASS INDEX: 33.9 KG/M2 | HEART RATE: 105 BPM | HEIGHT: 67 IN | SYSTOLIC BLOOD PRESSURE: 120 MMHG | WEIGHT: 216 LBS | OXYGEN SATURATION: 98 % | TEMPERATURE: 97.8 F

## 2021-09-01 DIAGNOSIS — F41.9 ANXIETY AND DEPRESSION: Primary | ICD-10-CM

## 2021-09-01 DIAGNOSIS — F32.A ANXIETY AND DEPRESSION: Primary | ICD-10-CM

## 2021-09-01 DIAGNOSIS — F51.01 PRIMARY INSOMNIA: ICD-10-CM

## 2021-09-01 PROCEDURE — 99213 OFFICE O/P EST LOW 20 MIN: CPT | Performed by: NURSE PRACTITIONER

## 2021-09-01 RX ORDER — ESCITALOPRAM OXALATE 20 MG/1
20 TABLET ORAL DAILY
Qty: 90 TABLET | Refills: 1 | Status: SHIPPED | OUTPATIENT
Start: 2021-09-01 | End: 2021-10-08 | Stop reason: ALTCHOICE

## 2021-09-01 NOTE — PATIENT INSTRUCTIONS
Depression   AMBULATORY CARE:   Depression  is a medical condition that causes feelings of sadness or hopelessness that do not go away  Depression may cause you to lose interest in things you used to enjoy  These feelings may interfere with your daily life  Common symptoms include the following:   · Appetite changes, or weight gain or loss    · Trouble going to sleep or staying asleep, or sleeping too much    · Fatigue or lack of energy    · Feeling restless, irritable, or withdrawn    · Feeling worthless, hopeless, discouraged, or guilty    · Trouble concentrating, remembering things, doing daily tasks, or making decisions    · Thoughts about hurting or killing yourself    Call your local emergency number (911 in the [address] [facility]) if:   · You think about harming yourself or someone else  · You have done something on purpose to hurt yourself  Call your therapist or doctor if:   · Your symptoms do not improve  · You cannot make it to your next appointment  · You have new symptoms  · You have questions or concerns about your condition or care  The following resources are available at any time to help you, if needed:   · [address] [place] [address]: [phone] (7-615-653-KUJN)     · Suicide Hotline: 1-282.557.6517 (0-361-HJFFBCC)     · For a list of international numbers: https://save org/find-help/international-resources/    Treatment for depression  may include medicine to relieve depression  Medicine is often used together with therapy  Therapy is a way for you to talk about your feelings and anything that may be causing depression  Therapy can be done alone or in a group  It may also be done with family members or a significant other  Self-care:   · Get regular physical activity  Try to be active for 30 minutes, 3 to 5 days a week  Physical activity can help relieve depression  Work with your healthcare provider to develop a plan that you enjoy   It may help to ask someone to be active with you     · Create a regular sleep schedule  A routine can help you relax before bed  Listen to music, read, or do yoga  Try to go to bed and wake up at the same time every day  Sleep is important for emotional health  · Eat a variety of healthy foods  Healthy foods include fruits, vegetables, whole-grain breads, low-fat dairy products, lean meats, fish, and cooked beans  A healthy meal plan is low in fat, salt, and added sugar  · Do not drink alcohol or use drugs  Alcohol and drugs can make depression worse  Talk to your therapist or doctor if you need help quitting  Follow up with your healthcare provider as directed: Your healthcare provider will monitor your progress at follow-up visits  He or she will also monitor your medicine if you take antidepressants  Your healthcare provider will ask if the medicine is helping  Tell him or her about any side effects or problems you may have with your medicine  The type or amount of medicine may need to be changed  Write down your questions so you remember to ask them during your visits  © Copyright [employer] 2021 Information is for End User's use only and may not be sold, redistributed or otherwise used for commercial purposes  All illustrations and images included in CareNotes® are the copyrighted property of A D A M , Inc  or Natalie Cummings   The above information is an  only  It is not intended as medical advice for individual conditions or treatments  Talk to your doctor, nurse or pharmacist before following any medical regimen to see if it is safe and effective for you

## 2021-09-01 NOTE — ASSESSMENT & PLAN NOTE
Patient notes "plateau" in symptoms  Notes decreased energy and feels unmotivated  Denies SI/HI  Anxiety not bad  Takes Buspar daily if needed, sometimes twice daily  Lexapro dose increased to 20 mg  Patient agreeable to BHT    Referral given, will re-eval in 3 months

## 2021-09-01 NOTE — PROGRESS NOTES
Assessment/Plan:    Anxiety and depression  Patient notes "plateau" in symptoms  Notes decreased energy and feels unmotivated  Denies SI/HI  Anxiety not bad  Takes Buspar daily if needed, sometimes twice daily  Lexapro dose increased to 20 mg  Patient agreeable to T  Referral given, will re-eval in 3 months    Primary insomnia  Continues trazodone 50 mg at bedtime  Denies issues with sleep  Diagnoses and all orders for this visit:    Anxiety and depression  -     Ambulatory referral to [facility]; Future  -     escitalopram (LEXAPRO) 20 mg tablet; Take 1 tablet (20 mg total) by mouth daily    Primary insomnia          Subjective:      Patient ID: Patric Sanchez is a 24 y o  female  Patient presents for re-evaluation of depression and anxiety  Patient has been on Lexapro for over a year and a half  Started on 5 mg initially, states earlier this year was increased to 10 mg  Patient was prescribed Buspar 5 mg for anxiety  Is to take twice daily, but states she takes it as needed most of the time  Patient notes symptoms of anxiety has lessened  Notes she has "plateaued" with depression symptoms  Recently notes decreased energy and is unmotivated  Patient denies SI/HI  Has string support system at home with family and friends  Is set to start an apprenticeship as an   The following portions of the patient's history were reviewed and updated as appropriate: allergies, current medications, past medical history, past social history, past surgical history and problem list     Review of Systems   Constitutional: Positive for activity change  Negative for appetite change, chills, fatigue and fever  HENT: Negative for congestion, ear pain and sore throat  Eyes: Negative for pain and visual disturbance  Respiratory: Negative for cough, chest tightness and shortness of breath  Cardiovascular: Negative for chest pain, palpitations and leg swelling  Gastrointestinal: Negative for abdominal pain, constipation, diarrhea, nausea and vomiting  Genitourinary: Negative for decreased urine volume, difficulty urinating, dysuria and hematuria  Musculoskeletal: Negative for arthralgias, back pain and myalgias  Skin: Negative for color change and rash  Neurological: Negative for dizziness, syncope, light-headedness and headaches  Psychiatric/Behavioral: Negative for agitation, dysphoric mood, sleep disturbance and suicidal ideas  The patient is not nervous/anxious  Objective:      /78   Pulse 105   Temp 97 8 °F (36 6 °C)   Ht 5' 7" (1 702 m)   Wt 98 kg (216 lb)   LMP 09/01/2021 (Exact Date)   SpO2 98%   BMI 33 83 kg/m²          Physical Exam  Vitals reviewed  Constitutional:       General: She is not in acute distress  Appearance: Normal appearance  She is not ill-appearing  HENT:      Head: Normocephalic and atraumatic  Right Ear: Tympanic membrane, ear canal and external ear normal       Left Ear: Tympanic membrane, ear canal and external ear normal       Nose: Nose normal       Mouth/Throat:      Mouth: Mucous membranes are moist       Pharynx: Oropharynx is clear  Eyes:      Extraocular Movements: Extraocular movements intact  Pupils: Pupils are equal, round, and reactive to light  Cardiovascular:      Rate and Rhythm: Normal rate and regular rhythm  Heart sounds: Normal heart sounds  No murmur heard  Pulmonary:      Effort: Pulmonary effort is normal  No respiratory distress  Breath sounds: Normal breath sounds  Abdominal:      General: Bowel sounds are normal       Palpations: Abdomen is soft  There is no mass  Tenderness: There is no abdominal tenderness  There is no right CVA tenderness or left CVA tenderness  Musculoskeletal:         General: Normal range of motion  Cervical back: Normal range of motion and neck supple  No muscular tenderness  Right lower leg: No edema  Left lower leg: No edema  Lymphadenopathy:      Cervical: No cervical adenopathy  Skin:     General: Skin is warm and dry  Neurological:      Mental Status: She is alert and oriented to person, place, and time  Motor: No weakness  Psychiatric:         Attention and Perception: Attention normal          Mood and Affect: Mood and affect normal          Speech: Speech normal          Behavior: Behavior normal  Behavior is cooperative  Thought Content: Thought content normal  Thought content does not include homicidal or suicidal ideation

## 2021-10-08 ENCOUNTER — OFFICE VISIT (OUTPATIENT)
Dept: FAMILY MEDICINE CLINIC | Facility: CLINIC | Age: 21
End: 2021-10-08
Payer: COMMERCIAL

## 2021-10-08 VITALS
OXYGEN SATURATION: 98 % | BODY MASS INDEX: 32.46 KG/M2 | HEART RATE: 90 BPM | HEIGHT: 67 IN | RESPIRATION RATE: 18 BRPM | DIASTOLIC BLOOD PRESSURE: 72 MMHG | SYSTOLIC BLOOD PRESSURE: 110 MMHG | WEIGHT: 206.8 LBS | TEMPERATURE: 96.2 F

## 2021-10-08 DIAGNOSIS — K58.2 IRRITABLE BOWEL SYNDROME WITH BOTH CONSTIPATION AND DIARRHEA: ICD-10-CM

## 2021-10-08 DIAGNOSIS — F32.A ANXIETY AND DEPRESSION: Primary | ICD-10-CM

## 2021-10-08 DIAGNOSIS — F41.9 ANXIETY AND DEPRESSION: Primary | ICD-10-CM

## 2021-10-08 DIAGNOSIS — Z12.4 CERVICAL CANCER SCREENING: ICD-10-CM

## 2021-10-08 DIAGNOSIS — K21.9 GASTROESOPHAGEAL REFLUX DISEASE WITHOUT ESOPHAGITIS: ICD-10-CM

## 2021-10-08 DIAGNOSIS — F51.01 PRIMARY INSOMNIA: ICD-10-CM

## 2021-10-08 DIAGNOSIS — Z00.00 HEALTHCARE MAINTENANCE: ICD-10-CM

## 2021-10-08 PROBLEM — H00.015 HORDEOLUM EXTERNUM OF LEFT LOWER EYELID: Status: RESOLVED | Noted: 2021-03-04 | Resolved: 2021-10-08

## 2021-10-08 PROBLEM — R03.0 ELEVATED BP WITHOUT DIAGNOSIS OF HYPERTENSION: Status: RESOLVED | Noted: 2020-02-28 | Resolved: 2021-10-08

## 2021-10-08 PROCEDURE — 99214 OFFICE O/P EST MOD 30 MIN: CPT | Performed by: NURSE PRACTITIONER

## 2021-10-08 RX ORDER — TRAZODONE HYDROCHLORIDE 50 MG/1
50 TABLET ORAL
Qty: 90 TABLET | Refills: 1 | Status: SHIPPED | OUTPATIENT
Start: 2021-10-08 | End: 2022-05-18 | Stop reason: SDUPTHER

## 2021-10-08 RX ORDER — ESCITALOPRAM OXALATE 10 MG/1
15 TABLET ORAL DAILY
Qty: 45 TABLET | Refills: 0 | Status: SHIPPED | OUTPATIENT
Start: 2021-10-08 | End: 2021-11-11 | Stop reason: SDUPTHER

## 2021-10-08 RX ORDER — HYDROXYZINE HYDROCHLORIDE 25 MG/1
25 TABLET, FILM COATED ORAL EVERY 6 HOURS PRN
Qty: 30 TABLET | Refills: 0 | Status: SHIPPED | OUTPATIENT
Start: 2021-10-08 | End: 2021-11-23 | Stop reason: ALTCHOICE

## 2021-11-11 DIAGNOSIS — F32.A ANXIETY AND DEPRESSION: ICD-10-CM

## 2021-11-11 DIAGNOSIS — F41.9 ANXIETY AND DEPRESSION: ICD-10-CM

## 2021-11-11 RX ORDER — ESCITALOPRAM OXALATE 10 MG/1
15 TABLET ORAL DAILY
Qty: 45 TABLET | Refills: 2 | Status: SHIPPED | OUTPATIENT
Start: 2021-11-11 | End: 2022-02-10 | Stop reason: SDUPTHER

## 2021-11-23 ENCOUNTER — OFFICE VISIT (OUTPATIENT)
Dept: FAMILY MEDICINE CLINIC | Facility: CLINIC | Age: 21
End: 2021-11-23
Payer: COMMERCIAL

## 2021-11-23 VITALS
DIASTOLIC BLOOD PRESSURE: 84 MMHG | BODY MASS INDEX: 33.81 KG/M2 | HEIGHT: 67 IN | TEMPERATURE: 96.9 F | HEART RATE: 92 BPM | SYSTOLIC BLOOD PRESSURE: 126 MMHG | OXYGEN SATURATION: 100 % | WEIGHT: 215.4 LBS

## 2021-11-23 DIAGNOSIS — R04.0 FREQUENT NOSEBLEEDS: Primary | ICD-10-CM

## 2021-11-23 DIAGNOSIS — F32.A ANXIETY AND DEPRESSION: ICD-10-CM

## 2021-11-23 DIAGNOSIS — F41.9 ANXIETY AND DEPRESSION: ICD-10-CM

## 2021-11-23 PROCEDURE — 99214 OFFICE O/P EST MOD 30 MIN: CPT | Performed by: NURSE PRACTITIONER

## 2021-11-23 RX ORDER — BUSPIRONE HYDROCHLORIDE 5 MG/1
5 TABLET ORAL 2 TIMES DAILY
Qty: 60 TABLET | Refills: 0 | Status: SHIPPED | OUTPATIENT
Start: 2021-11-23 | End: 2022-02-07 | Stop reason: SDUPTHER

## 2021-12-17 DIAGNOSIS — R11.0 NAUSEA: ICD-10-CM

## 2021-12-20 RX ORDER — ONDANSETRON 4 MG/1
4 TABLET, ORALLY DISINTEGRATING ORAL EVERY 6 HOURS PRN
Qty: 20 TABLET | Refills: 0 | Status: SHIPPED | OUTPATIENT
Start: 2021-12-20 | End: 2022-01-21 | Stop reason: SDUPTHER

## 2022-01-21 DIAGNOSIS — R11.0 NAUSEA: ICD-10-CM

## 2022-01-24 RX ORDER — ONDANSETRON 4 MG/1
4 TABLET, ORALLY DISINTEGRATING ORAL EVERY 6 HOURS PRN
Qty: 20 TABLET | Refills: 0 | Status: SHIPPED | OUTPATIENT
Start: 2022-01-24 | End: 2022-04-19 | Stop reason: SDUPTHER

## 2022-02-07 DIAGNOSIS — F41.9 ANXIETY AND DEPRESSION: ICD-10-CM

## 2022-02-07 DIAGNOSIS — F32.A ANXIETY AND DEPRESSION: ICD-10-CM

## 2022-02-07 RX ORDER — BUSPIRONE HYDROCHLORIDE 5 MG/1
5 TABLET ORAL 2 TIMES DAILY
Qty: 60 TABLET | Refills: 0 | Status: SHIPPED | OUTPATIENT
Start: 2022-02-07 | End: 2022-04-19 | Stop reason: SDUPTHER

## 2022-02-10 DIAGNOSIS — F32.A ANXIETY AND DEPRESSION: ICD-10-CM

## 2022-02-10 DIAGNOSIS — F41.9 ANXIETY AND DEPRESSION: ICD-10-CM

## 2022-02-11 RX ORDER — ESCITALOPRAM OXALATE 10 MG/1
15 TABLET ORAL DAILY
Qty: 45 TABLET | Refills: 0 | Status: SHIPPED | OUTPATIENT
Start: 2022-02-11 | End: 2022-03-13 | Stop reason: SDUPTHER

## 2022-03-13 DIAGNOSIS — F41.9 ANXIETY AND DEPRESSION: ICD-10-CM

## 2022-03-13 DIAGNOSIS — F32.A ANXIETY AND DEPRESSION: ICD-10-CM

## 2022-03-14 RX ORDER — ESCITALOPRAM OXALATE 10 MG/1
15 TABLET ORAL DAILY
Qty: 45 TABLET | Refills: 0 | Status: SHIPPED | OUTPATIENT
Start: 2022-03-14 | End: 2022-04-16 | Stop reason: SDUPTHER

## 2022-03-31 ENCOUNTER — TELEPHONE (OUTPATIENT)
Dept: GASTROENTEROLOGY | Facility: CLINIC | Age: 22
End: 2022-03-31

## 2022-03-31 DIAGNOSIS — K21.9 GASTROESOPHAGEAL REFLUX DISEASE WITHOUT ESOPHAGITIS: Primary | ICD-10-CM

## 2022-03-31 RX ORDER — PANTOPRAZOLE SODIUM 40 MG/1
40 TABLET, DELAYED RELEASE ORAL DAILY
Qty: 30 TABLET | Refills: 3 | Status: SHIPPED | OUTPATIENT
Start: 2022-03-31 | End: 2022-04-19

## 2022-03-31 NOTE — TELEPHONE ENCOUNTER
YARITZA: Spoke with patient  History of GERD, IBS    Patient c/o occasional episodes of vomiting 1/week and today multiple episodes  Denies fever, Chills, abdominal pain, SOB, weakness  She states she has a redness, and oozing in her umbilicus which she will have someone evaluate  Reassured patient  She will try zofran disintegrating tablet  Patient is requesting another course of pantoprazole  Last course was in June 2021  Resent  Scheduled follow-up   Patient will let irizarry know if symptoms persist

## 2022-03-31 NOTE — TELEPHONE ENCOUNTER
Melody Aj pt  Patient called, she said she has been throwing up all morning, at least 6 times, and stuff is coming out of her belly button  Please call

## 2022-04-16 DIAGNOSIS — F41.9 ANXIETY AND DEPRESSION: ICD-10-CM

## 2022-04-16 DIAGNOSIS — F32.A ANXIETY AND DEPRESSION: ICD-10-CM

## 2022-04-18 RX ORDER — ESCITALOPRAM OXALATE 10 MG/1
15 TABLET ORAL DAILY
Qty: 45 TABLET | Refills: 0 | Status: SHIPPED | OUTPATIENT
Start: 2022-04-18 | End: 2022-04-19 | Stop reason: SDUPTHER

## 2022-04-19 ENCOUNTER — OFFICE VISIT (OUTPATIENT)
Dept: FAMILY MEDICINE CLINIC | Facility: CLINIC | Age: 22
End: 2022-04-19
Payer: COMMERCIAL

## 2022-04-19 VITALS
RESPIRATION RATE: 18 BRPM | HEIGHT: 67 IN | SYSTOLIC BLOOD PRESSURE: 130 MMHG | BODY MASS INDEX: 33.71 KG/M2 | DIASTOLIC BLOOD PRESSURE: 84 MMHG | TEMPERATURE: 97.4 F | WEIGHT: 214.8 LBS | OXYGEN SATURATION: 99 % | HEART RATE: 127 BPM

## 2022-04-19 DIAGNOSIS — F32.A ANXIETY AND DEPRESSION: ICD-10-CM

## 2022-04-19 DIAGNOSIS — F41.9 ANXIETY AND DEPRESSION: ICD-10-CM

## 2022-04-19 DIAGNOSIS — K21.9 GASTROESOPHAGEAL REFLUX DISEASE WITHOUT ESOPHAGITIS: ICD-10-CM

## 2022-04-19 DIAGNOSIS — R11.0 NAUSEA: ICD-10-CM

## 2022-04-19 PROCEDURE — 3725F SCREEN DEPRESSION PERFORMED: CPT | Performed by: NURSE PRACTITIONER

## 2022-04-19 PROCEDURE — 3008F BODY MASS INDEX DOCD: CPT | Performed by: NURSE PRACTITIONER

## 2022-04-19 PROCEDURE — 99214 OFFICE O/P EST MOD 30 MIN: CPT | Performed by: NURSE PRACTITIONER

## 2022-04-19 PROCEDURE — 1036F TOBACCO NON-USER: CPT | Performed by: NURSE PRACTITIONER

## 2022-04-19 RX ORDER — PANTOPRAZOLE SODIUM 40 MG/1
40 TABLET, DELAYED RELEASE ORAL 2 TIMES DAILY
Qty: 30 TABLET | Refills: 3
Start: 2022-04-19 | End: 2022-04-25 | Stop reason: SDUPTHER

## 2022-04-19 RX ORDER — ESCITALOPRAM OXALATE 20 MG/1
20 TABLET ORAL DAILY
Qty: 30 TABLET | Refills: 0 | Status: SHIPPED | OUTPATIENT
Start: 2022-04-19 | End: 2022-06-10 | Stop reason: SDUPTHER

## 2022-04-19 RX ORDER — ALPRAZOLAM 0.5 MG/1
0.5 TABLET ORAL
Qty: 30 TABLET | Refills: 0 | Status: SHIPPED | OUTPATIENT
Start: 2022-04-19

## 2022-04-19 RX ORDER — ONDANSETRON 4 MG/1
4 TABLET, ORALLY DISINTEGRATING ORAL EVERY 6 HOURS PRN
Qty: 20 TABLET | Refills: 0 | Status: SHIPPED | OUTPATIENT
Start: 2022-04-19 | End: 2022-05-24 | Stop reason: SDUPTHER

## 2022-04-19 RX ORDER — BUSPIRONE HYDROCHLORIDE 15 MG/1
15 TABLET ORAL 2 TIMES DAILY
Qty: 60 TABLET | Refills: 0 | Status: SHIPPED | OUTPATIENT
Start: 2022-04-19 | End: 2022-05-18 | Stop reason: SDUPTHER

## 2022-04-19 RX ORDER — SUCRALFATE ORAL 1 G/10ML
1 SUSPENSION ORAL 4 TIMES DAILY
Qty: 420 ML | Refills: 0 | Status: SHIPPED | OUTPATIENT
Start: 2022-04-19

## 2022-04-19 NOTE — PROGRESS NOTES
Assessment/Plan:     Chronic Problems:  Anxiety and depression  Will increase lexapro to 20 mg daily  Increase buspar to 15mg twice daily  Will give xanax to use sparingly as needed for panic attacks  Gastroesophageal reflux disease without esophagitis  We did discuss that a lot of the symptoms are caused by her severe anxiety, so we will treat that  We will increase protonix to twice daily for now and use sucralfate for short term  Zofran as needed for nausea  Visit Diagnosis:  Diagnoses and all orders for this visit:    Anxiety and depression  -     escitalopram (Lexapro) 20 mg tablet; Take 1 tablet (20 mg total) by mouth daily  -     busPIRone (BUSPAR) 15 mg tablet; Take 1 tablet (15 mg total) by mouth 2 (two) times a day  -     ALPRAZolam (XANAX) 0 5 mg tablet; Take 1 tablet (0 5 mg total) by mouth daily at bedtime as needed for anxiety    Gastroesophageal reflux disease without esophagitis  -     pantoprazole (PROTONIX) 40 mg tablet; Take 1 tablet (40 mg total) by mouth 2 (two) times a day  -     sucralfate (CARAFATE) 1 g/10 mL suspension; Take 10 mL (1 g total) by mouth 4 (four) times a day    Nausea  -     ondansetron (ZOFRAN-ODT) 4 mg disintegrating tablet; Take 1 tablet (4 mg total) by mouth every 6 (six) hours as needed for nausea or vomiting          Subjective:    Patient ID: Alyx Ambriz is a 24 y o  female  Patient presents with concerns of GERD and anxiety  She is throwing up several times a day for the past month  She has raging headaches  She is so anxious  Anxiety is 12/10 currently  She states she has a very happy life, very good relationship with her boyfriend and just got a puppy  She feels like she is supposed to be happy and enjoying herself, but her anxiety is not allowing her currently  She has a new job at [employer] which she really enjoys, but feels like she cannot work to her full capacity due to the current anxiety   She does believe the GI issues are related to her anxiety  The following portions of the patient's history were reviewed and updated as appropriate: allergies, current medications, past family history, past medical history, past social history, past surgical history and problem list     Review of Systems   Constitutional: Negative for chills and fever  HENT: Negative for ear pain and sore throat  Eyes: Negative for pain and visual disturbance  Respiratory: Negative for cough and shortness of breath  Cardiovascular: Negative for chest pain and palpitations  Gastrointestinal: Positive for nausea and vomiting  Negative for abdominal pain  Genitourinary: Negative for dysuria and hematuria  Musculoskeletal: Negative for arthralgias and back pain  Skin: Negative for color change and rash  Neurological: Negative for seizures and syncope  Psychiatric/Behavioral: Positive for dysphoric mood and sleep disturbance  The patient is nervous/anxious (10/10)  All other systems reviewed and are negative          /84   Pulse (!) 127   Temp (!) 97 4 °F (36 3 °C)   Resp 18   Ht 5' 7" (1 702 m)   Wt 97 4 kg (214 lb 12 8 oz)   SpO2 99%   BMI 33 64 kg/m²   Social History     Socioeconomic History    Marital status: Single     Spouse name: Not on file    Number of children: Not on file    Years of education: Not on file    Highest education level: Not on file   Occupational History    Not on file   Tobacco Use    Smoking status: Never Smoker    Smokeless tobacco: Never Used   Vaping Use    Vaping Use: Never used   Substance and Sexual Activity    Alcohol use: No    Drug use: No    Sexual activity: Yes     Partners: Male     Birth control/protection: OCP   Other Topics Concern    Not on file   Social History Narrative    Not on file     Social Determinants of Health     Financial Resource Strain: Not on file   Food Insecurity: Not on file   Transportation Needs: Not on file   Physical Activity: Not on file   Stress: Not on file Social Connections: Not on file   Intimate Partner Violence: Not on file   Housing Stability: Not on file     Past Medical History:   Diagnosis Date    Migraine      Family History   Problem Relation Age of Onset    Breast cancer Mother 39    Hypertension Mother     Hypertension Father     Asthma Brother     Heart defect Brother      Past Surgical History:   Procedure Laterality Date    ANTERIOR CRUCIATE LIGAMENT REPAIR  2016       Current Outpatient Medications:     busPIRone (BUSPAR) 15 mg tablet, Take 1 tablet (15 mg total) by mouth 2 (two) times a day, Disp: 60 tablet, Rfl: 0    escitalopram (Lexapro) 20 mg tablet, Take 1 tablet (20 mg total) by mouth daily, Disp: 30 tablet, Rfl: 0    ondansetron (ZOFRAN-ODT) 4 mg disintegrating tablet, Take 1 tablet (4 mg total) by mouth every 6 (six) hours as needed for nausea or vomiting, Disp: 20 tablet, Rfl: 0    pantoprazole (PROTONIX) 40 mg tablet, Take 1 tablet (40 mg total) by mouth 2 (two) times a day, Disp: 30 tablet, Rfl: 3    traZODone (DESYREL) 50 mg tablet, Take 1 tablet (50 mg total) by mouth daily at bedtime as needed for sleep, Disp: 90 tablet, Rfl: 1    ALPRAZolam (XANAX) 0 5 mg tablet, Take 1 tablet (0 5 mg total) by mouth daily at bedtime as needed for anxiety, Disp: 30 tablet, Rfl: 0    sucralfate (CARAFATE) 1 g/10 mL suspension, Take 10 mL (1 g total) by mouth 4 (four) times a day, Disp: 420 mL, Rfl: 0    Allergies   Allergen Reactions    Nickel           Lab Review   No visits with results within 2 Month(s) from this visit     Latest known visit with results is:   Lab on 11/24/2020   Component Date Value    HIV-1/HIV-2 Ab 11/24/2020 Non-Reactive     WBC 11/24/2020 11 39*    RBC 11/24/2020 4 46     Hemoglobin 11/24/2020 13 7     Hematocrit 11/24/2020 41 1     MCV 11/24/2020 92     MCH 11/24/2020 30 7     MCHC 11/24/2020 33 3     RDW 11/24/2020 12 7     MPV 11/24/2020 10 9     Platelets 98/51/9614 317     nRBC 11/24/2020 0     Neutrophils Relative 11/24/2020 70     Immat GRANS % 11/24/2020 1     Lymphocytes Relative 11/24/2020 21     Monocytes Relative 11/24/2020 8     Eosinophils Relative 11/24/2020 0     Basophils Relative 11/24/2020 0     Neutrophils Absolute 11/24/2020 7 92*    Immature Grans Absolute 11/24/2020 0 08     Lymphocytes Absolute 11/24/2020 2 43     Monocytes Absolute 11/24/2020 0 89     Eosinophils Absolute 11/24/2020 0 03     Basophils Absolute 11/24/2020 0 04     TSH 3RD GENERATON 11/24/2020 1 159     Sodium 11/24/2020 140     Potassium 11/24/2020 4 1     Chloride 11/24/2020 103     CO2 11/24/2020 25     ANION GAP 11/24/2020 12     BUN 11/24/2020 12     Creatinine 11/24/2020 0 63     Glucose, Fasting 11/24/2020 76     Calcium 11/24/2020 9 7     AST 11/24/2020 9     ALT 11/24/2020 23     Alkaline Phosphatase 11/24/2020 50     Total Protein 11/24/2020 8 0     Albumin 11/24/2020 3 9     Total Bilirubin 11/24/2020 0 60     eGFR 11/24/2020 129     Cholesterol 11/24/2020 213*    Triglycerides 11/24/2020 84     HDL, Direct 11/24/2020 68     LDL Calculated 11/24/2020 128*    Non-HDL-Chol (CHOL-HDL) 11/24/2020 145     Lyme total antibody 11/24/2020 27     MARCELLE 11/24/2020 Negative     Rheumatoid Factor 11/24/2020 Negative     HLA B27 11/24/2020 Negative     IgA 11/24/2020 221     Gliadin IgA 11/24/2020 3     Gliadin IgG 11/24/2020 3     Tissue Transglut Ab IGG 11/24/2020 <2     TISSUE TRANSGLUTAMINASE * 11/24/2020 <2     Endomysial IgA 11/24/2020 Negative         Imaging: No results found  Objective:     Physical Exam  Constitutional:       Appearance: She is well-developed  Cardiovascular:      Rate and Rhythm: Normal rate and regular rhythm  Heart sounds: Normal heart sounds  No murmur heard  Pulmonary:      Effort: Pulmonary effort is normal  No respiratory distress  Breath sounds: Normal breath sounds  Skin:     General: Skin is warm and dry     Neurological: Mental Status: She is alert and oriented to person, place, and time  Psychiatric:         Mood and Affect: Mood is anxious (visibly shaking)  Affect is tearful  Speech: Speech is rapid and pressured  There are no Patient Instructions on file for this visit  LEONILA Thurston    Portions of the record may have been created with voice recognition software  Occasional wrong word or "sound a like" substitutions may have occurred due to the inherent limitations of voice recognition software  Read the chart carefully and recognize, using context, where substitutions have occurred

## 2022-04-19 NOTE — ASSESSMENT & PLAN NOTE
We did discuss that a lot of the symptoms are caused by her severe anxiety, so we will treat that  We will increase protonix to twice daily for now and use sucralfate for short term  Zofran as needed for nausea

## 2022-04-19 NOTE — ASSESSMENT & PLAN NOTE
Will increase lexapro to 20 mg daily  Increase buspar to 15mg twice daily  Will give xanax to use sparingly as needed for panic attacks

## 2022-04-19 NOTE — LETTER
April 19, 2022     Patient: Lucie Rosales  YOB: 2000  Date of Visit: 4/19/2022      To Whom it May Concern:    Leander Reed is under my professional care  Carisa Brooks was seen in my office on 4/19/2022  Carisa Brooks may return to work on 4/20/2022  If you have any questions or concerns, please don't hesitate to call           Sincerely,          LEONILA Thurston        CC: No Recipients

## 2022-04-20 ENCOUNTER — PATIENT MESSAGE (OUTPATIENT)
Dept: FAMILY MEDICINE CLINIC | Facility: CLINIC | Age: 22
End: 2022-04-20

## 2022-04-25 DIAGNOSIS — K21.9 GASTROESOPHAGEAL REFLUX DISEASE WITHOUT ESOPHAGITIS: ICD-10-CM

## 2022-04-25 NOTE — TELEPHONE ENCOUNTER
Pt called and left message on [employer]   Stated she had tried to refill her prantoprezol 40 mg taht she takes twice daily at her pharmacy but they told her it was deactivated even though it was just refilled last week on the 19th?

## 2022-04-26 RX ORDER — PANTOPRAZOLE SODIUM 40 MG/1
40 TABLET, DELAYED RELEASE ORAL 2 TIMES DAILY
Qty: 60 TABLET | Refills: 1
Start: 2022-04-26 | End: 2022-04-28 | Stop reason: SDUPTHER

## 2022-04-28 DIAGNOSIS — K21.9 GASTROESOPHAGEAL REFLUX DISEASE WITHOUT ESOPHAGITIS: ICD-10-CM

## 2022-04-28 RX ORDER — PANTOPRAZOLE SODIUM 40 MG/1
40 TABLET, DELAYED RELEASE ORAL 2 TIMES DAILY
Qty: 60 TABLET | Refills: 1 | Status: SHIPPED | OUTPATIENT
Start: 2022-04-28 | End: 2022-07-05 | Stop reason: SDUPTHER

## 2022-05-03 ENCOUNTER — OFFICE VISIT (OUTPATIENT)
Dept: GASTROENTEROLOGY | Facility: CLINIC | Age: 22
End: 2022-05-03
Payer: COMMERCIAL

## 2022-05-03 VITALS
SYSTOLIC BLOOD PRESSURE: 122 MMHG | BODY MASS INDEX: 34.53 KG/M2 | HEIGHT: 67 IN | DIASTOLIC BLOOD PRESSURE: 78 MMHG | HEART RATE: 112 BPM | OXYGEN SATURATION: 98 % | WEIGHT: 220 LBS

## 2022-05-03 DIAGNOSIS — R11.2 NAUSEA AND VOMITING, UNSPECIFIED VOMITING TYPE: Primary | ICD-10-CM

## 2022-05-03 DIAGNOSIS — K21.9 GASTROESOPHAGEAL REFLUX DISEASE WITHOUT ESOPHAGITIS: ICD-10-CM

## 2022-05-03 PROCEDURE — 99213 OFFICE O/P EST LOW 20 MIN: CPT | Performed by: PHYSICIAN ASSISTANT

## 2022-05-03 NOTE — PROGRESS NOTES
[facility] - Outpatient Follow-up Note  Yayo Crain 24 y o  female MRN: [id number]  Encounter: [id number]          ASSESSMENT AND PLAN:      1  Nausea and vomiting, unspecified vomiting type  2  Gastroesophageal reflux disease without esophagitis  -Will plan right upper quadrant ultrasound to rule out gallbladder pathology   -Will continue pantoprazole 40 mg twice a day for a total of 8 weeks   -Patient will complete Carafate prescription  -GERD dietary modifications reviewed   -No plans for any further endoscopic evaluation at this time  ______________________________________________________________________    SUBJECTIVE:    39-year-old female presents the office today with a chief complaint of nausea vomiting  Patient reports that a month ago she started having severe episodes of nausea and vomiting  She reports some days she would vomit 7 times before she even left for work  She reports that she was started on Protonix daily initially but this did not help  She was then increased to twice a day Protonix and Carafate was started  Overall patient is reporting improvement  She reports her nausea has improved significantly in her vomiting is a stopped  Patien did have an EGD in 2020 that was normal   Patient does still have her gallbladder in place  Patient denies any significant fevers or chills  REVIEW OF SYSTEMS IS OTHERWISE NEGATIVE        Historical Information   Past Medical History:   Diagnosis Date    Anxiety     Depression     GERD (gastroesophageal reflux disease)     Migraine      Past Surgical History:   Procedure Laterality Date    ANTERIOR CRUCIATE LIGAMENT REPAIR  2016     Social History   Social History     Substance and Sexual Activity   Alcohol Use No     Social History     Substance and Sexual Activity   Drug Use No     Social History     Tobacco Use   Smoking Status Never Smoker   Smokeless Tobacco Never Used     Family History   Problem Relation Age of Onset   Jean Pelletier Breast cancer Mother 39    Hypertension Mother     Hypertension Father     Asthma Brother     Heart defect Brother        Meds/Allergies       Current Outpatient Medications:     ALPRAZolam (XANAX) 0 5 mg tablet    busPIRone (BUSPAR) 15 mg tablet    escitalopram (Lexapro) 20 mg tablet    ondansetron (ZOFRAN-ODT) 4 mg disintegrating tablet    pantoprazole (PROTONIX) 40 mg tablet    sucralfate (CARAFATE) 1 g/10 mL suspension    traZODone (DESYREL) 50 mg tablet    Allergies   Allergen Reactions    Nickel            Objective     Blood pressure 122/78, pulse (!) 112, height 5' 7" (1 702 m), weight 99 8 kg (220 lb), SpO2 98 %, not currently breastfeeding  Body mass index is 34 46 kg/m²  PHYSICAL EXAM:      General Appearance:   Alert, cooperative, no distress   HEENT:   Normocephalic, atraumatic, anicteric      Neck:  Supple, symmetrical, trachea midline   Lungs:   Clear to auscultation bilaterally; no rales, rhonchi or wheezing; respirations unlabored    Heart[de-identified]   Regular rate and rhythm; no murmur, rub, or gallop  Abdomen:   Soft, non-tender, non-distended; normal bowel sounds; no masses, no organomegaly    Genitalia:   Deferred    Rectal:   Deferred    Extremities:  No cyanosis, clubbing or edema    Pulses:  2+ and symmetric    Skin:  No jaundice, rashes, or lesions    Lymph nodes:  No palpable cervical lymphadenopathy        Lab Results:   No visits with results within 1 Day(s) from this visit     Latest known visit with results is:   Lab on 11/24/2020   Component Date Value    HIV-1/HIV-2 Ab 11/24/2020 Non-Reactive     WBC 11/24/2020 11 39*    RBC 11/24/2020 4 46     Hemoglobin 11/24/2020 13 7     Hematocrit 11/24/2020 41 1     MCV 11/24/2020 92     MCH 11/24/2020 30 7     MCHC 11/24/2020 33 3     RDW 11/24/2020 12 7     MPV 11/24/2020 10 9     Platelets 42/52/7164 317     nRBC 11/24/2020 0     Neutrophils Relative 11/24/2020 70     Immat GRANS % 11/24/2020 1  Lymphocytes Relative 11/24/2020 21     Monocytes Relative 11/24/2020 8     Eosinophils Relative 11/24/2020 0     Basophils Relative 11/24/2020 0     Neutrophils Absolute 11/24/2020 7 92*    Immature Grans Absolute 11/24/2020 0 08     Lymphocytes Absolute 11/24/2020 2 43     Monocytes Absolute 11/24/2020 0 89     Eosinophils Absolute 11/24/2020 0 03     Basophils Absolute 11/24/2020 0 04     TSH 3RD GENERATON 11/24/2020 1 159     Sodium 11/24/2020 140     Potassium 11/24/2020 4 1     Chloride 11/24/2020 103     CO2 11/24/2020 25     ANION GAP 11/24/2020 12     BUN 11/24/2020 12     Creatinine 11/24/2020 0 63     Glucose, Fasting 11/24/2020 76     Calcium 11/24/2020 9 7     AST 11/24/2020 9     ALT 11/24/2020 23     Alkaline Phosphatase 11/24/2020 50     Total Protein 11/24/2020 8 0     Albumin 11/24/2020 3 9     Total Bilirubin 11/24/2020 0 60     eGFR 11/24/2020 129     Cholesterol 11/24/2020 213*    Triglycerides 11/24/2020 84     HDL, Direct 11/24/2020 68     LDL Calculated 11/24/2020 128*    Non-HDL-Chol (CHOL-HDL) 11/24/2020 145     Lyme total antibody 11/24/2020 27     MARCELLE 11/24/2020 Negative     Rheumatoid Factor 11/24/2020 Negative     HLA B27 11/24/2020 Negative     IgA 11/24/2020 221     Gliadin IgA 11/24/2020 3     Gliadin IgG 11/24/2020 3     Tissue Transglut Ab IGG 11/24/2020 <2     TISSUE TRANSGLUTAMINASE * 11/24/2020 <2     Endomysial IgA 11/24/2020 Negative          Radiology Results:   No results found

## 2022-05-03 NOTE — LETTER
May 3, 2022     Herrera Brian, [facility]    Patient: Azael Lopez   YOB: 2000   Date of Visit: 5/3/2022       Dear Dr Cinthia Loja:    Thank you for referring Chilo Velazquez to me for evaluation  Below are my notes for this consultation  If you have questions, please do not hesitate to call me  I look forward to following your patient along with you  Sincerely,        Ousmane Wheeler PA-C        CC: No Recipients  Eleanor Christie  5/3/2022  2:27 PM  Sign when Signing Visit  [facility] - Outpatient Follow-up Note  Azael Lopez 24 y o  female MRN: [id number]  Encounter: [id number]          ASSESSMENT AND PLAN:      1  Nausea and vomiting, unspecified vomiting type  2  Gastroesophageal reflux disease without esophagitis  -Will plan right upper quadrant ultrasound to rule out gallbladder pathology   -Will continue pantoprazole 40 mg twice a day for a total of 8 weeks   -Patient will complete Carafate prescription  -GERD dietary modifications reviewed   -No plans for any further endoscopic evaluation at this time  ______________________________________________________________________    SUBJECTIVE:    24-year-old female presents the office today with a chief complaint of nausea vomiting  Patient reports that a month ago she started having severe episodes of nausea and vomiting  She reports some days she would vomit 7 times before she even left for work  She reports that she was started on Protonix daily initially but this did not help  She was then increased to twice a day Protonix and Carafate was started  Overall patient is reporting improvement  She reports her nausea has improved significantly in her vomiting is a stopped  Patien did have an EGD in 2020 that was normal   Patient does still have her gallbladder in place  Patient denies any significant fevers or chills      REVIEW OF SYSTEMS IS OTHERWISE NEGATIVE  Historical Information   Past Medical History:   Diagnosis Date    Anxiety     Depression     GERD (gastroesophageal reflux disease)     Migraine      Past Surgical History:   Procedure Laterality Date    ANTERIOR CRUCIATE LIGAMENT REPAIR  2016     Social History   Social History     Substance and Sexual Activity   Alcohol Use No     Social History     Substance and Sexual Activity   Drug Use No     Social History     Tobacco Use   Smoking Status Never Smoker   Smokeless Tobacco Never Used     Family History   Problem Relation Age of Onset    Breast cancer Mother 39    Hypertension Mother     Hypertension Father     Asthma Brother     Heart defect Brother        Meds/Allergies       Current Outpatient Medications:     ALPRAZolam (XANAX) 0 5 mg tablet    busPIRone (BUSPAR) 15 mg tablet    escitalopram (Lexapro) 20 mg tablet    ondansetron (ZOFRAN-ODT) 4 mg disintegrating tablet    pantoprazole (PROTONIX) 40 mg tablet    sucralfate (CARAFATE) 1 g/10 mL suspension    traZODone (DESYREL) 50 mg tablet    Allergies   Allergen Reactions    Nickel            Objective     Blood pressure 122/78, pulse (!) 112, height 5' 7" (1 702 m), weight 99 8 kg (220 lb), SpO2 98 %, not currently breastfeeding  Body mass index is 34 46 kg/m²  PHYSICAL EXAM:      General Appearance:   Alert, cooperative, no distress   HEENT:   Normocephalic, atraumatic, anicteric      Neck:  Supple, symmetrical, trachea midline   Lungs:   Clear to auscultation bilaterally; no rales, rhonchi or wheezing; respirations unlabored    Heart[de-identified]   Regular rate and rhythm; no murmur, rub, or gallop     Abdomen:   Soft, non-tender, non-distended; normal bowel sounds; no masses, no organomegaly    Genitalia:   Deferred    Rectal:   Deferred    Extremities:  No cyanosis, clubbing or edema    Pulses:  2+ and symmetric    Skin:  No jaundice, rashes, or lesions    Lymph nodes:  No palpable cervical lymphadenopathy        Lab Results: No visits with results within 1 Day(s) from this visit     Latest known visit with results is:   Lab on 11/24/2020   Component Date Value    HIV-1/HIV-2 Ab 11/24/2020 Non-Reactive     WBC 11/24/2020 11 39*    RBC 11/24/2020 4 46     Hemoglobin 11/24/2020 13 7     Hematocrit 11/24/2020 41 1     MCV 11/24/2020 92     MCH 11/24/2020 30 7     MCHC 11/24/2020 33 3     RDW 11/24/2020 12 7     MPV 11/24/2020 10 9     Platelets 18/03/0732 317     nRBC 11/24/2020 0     Neutrophils Relative 11/24/2020 70     Immat GRANS % 11/24/2020 1     Lymphocytes Relative 11/24/2020 21     Monocytes Relative 11/24/2020 8     Eosinophils Relative 11/24/2020 0     Basophils Relative 11/24/2020 0     Neutrophils Absolute 11/24/2020 7 92*    Immature Grans Absolute 11/24/2020 0 08     Lymphocytes Absolute 11/24/2020 2 43     Monocytes Absolute 11/24/2020 0 89     Eosinophils Absolute 11/24/2020 0 03     Basophils Absolute 11/24/2020 0 04     TSH 3RD GENERATON 11/24/2020 1 159     Sodium 11/24/2020 140     Potassium 11/24/2020 4 1     Chloride 11/24/2020 103     CO2 11/24/2020 25     ANION GAP 11/24/2020 12     BUN 11/24/2020 12     Creatinine 11/24/2020 0 63     Glucose, Fasting 11/24/2020 76     Calcium 11/24/2020 9 7     AST 11/24/2020 9     ALT 11/24/2020 23     Alkaline Phosphatase 11/24/2020 50     Total Protein 11/24/2020 8 0     Albumin 11/24/2020 3 9     Total Bilirubin 11/24/2020 0 60     eGFR 11/24/2020 129     Cholesterol 11/24/2020 213*    Triglycerides 11/24/2020 84     HDL, Direct 11/24/2020 68     LDL Calculated 11/24/2020 128*    Non-HDL-Chol (CHOL-HDL) 11/24/2020 145     Lyme total antibody 11/24/2020 27     MARCELLE 11/24/2020 Negative     Rheumatoid Factor 11/24/2020 Negative     HLA B27 11/24/2020 Negative     IgA 11/24/2020 221     Gliadin IgA 11/24/2020 3     Gliadin IgG 11/24/2020 3     Tissue Transglut Ab IGG 11/24/2020 <2     TISSUE TRANSGLUTAMINASE * 11/24/2020 <2  Endomysial IgA 11/24/2020 Negative          Radiology Results:   No results found

## 2022-05-03 NOTE — PATIENT INSTRUCTIONS
Abdominal Pain   WHAT YOU NEED TO KNOW:   Abdominal pain can be dull, achy, or sharp  You may have pain in one area of your abdomen, or in your entire abdomen  Your pain may be caused by a condition such as constipation, food sensitivity or poisoning, infection, or a blockage  Abdominal pain can also be from a hernia, appendicitis, or an ulcer  Liver, gallbladder, or kidney conditions can also cause abdominal pain  The cause of your abdominal pain may not be known  DISCHARGE INSTRUCTIONS:   Call your local emergency number (911 in the [address] [facility]) if:   · You have new chest pain or shortness of breath  Return to the emergency department if:   · You have pulsing pain in your upper abdomen or lower back that suddenly becomes constant  · Your pain is in the right lower abdominal area and worsens with movement  · You have a fever over 100 4°F (38°C) or shaking chills  · You are vomiting and cannot keep food or liquids down  · Your pain does not improve or gets worse over the next 8 to 12 hours  · You see blood in your vomit or bowel movements, or they look black and tarry  · Your skin or the whites of your eyes turn yellow  · You are a woman and have a large amount of vaginal bleeding that is not your monthly period  Call your doctor if:   · You have pain in your lower back  · You are a man and have pain in your testicles  · You have pain when you urinate  · You have questions or concerns about your condition or care  Medicines:   · Prescription pain medicine  may be given  Ask your healthcare provider how to take this medicine safely  Some prescription pain medicines contain acetaminophen  Do not take other medicines that contain acetaminophen without talking to your healthcare provider  Too much acetaminophen may cause liver damage  Prescription pain medicine may cause constipation  Ask your healthcare provider how to prevent or treat constipation       · Medicines  may be given to calm your stomach or prevent vomiting  · Take your medicine as directed  Contact your healthcare provider if you think your medicine is not helping or if you have side effects  Tell him of her if you are allergic to any medicine  Keep a list of the medicines, vitamins, and herbs you take  Include the amounts, and when and why you take them  Bring the list or the pill bottles to follow-up visits  Carry your medicine list with you in case of an emergency  Manage your symptoms:   · Apply heat  on your abdomen for 20 to 30 minutes every 2 hours for as many days as directed  Heat helps decrease pain and muscle spasms  · Make changes to the food you eat, if needed  Do not eat foods that cause abdominal pain or other symptoms  Eat small meals more often  The following changes may also help:    ? Eat more high-fiber foods if you are constipated  High-fiber foods include fruits, vegetables, whole-grain foods, and legumes  ? Do not eat foods that cause gas if you have bloating  Examples include broccoli, cabbage, and cauliflower  Do not drink soda or carbonated drinks  These may also cause gas  ? Do not eat foods or drinks that contain sorbitol or fructose if you have diarrhea and bloating  Some examples are fruit juices, candy, jelly, and sugar-free gum  ? Do not eat high-fat foods  Examples include fried foods, cheeseburgers, hot dogs, and desserts  ? Limit or do not have caffeine  Caffeine may make symptoms such as heartburn or nausea worse  ? Drink more liquids to prevent dehydration from diarrhea or vomiting  Ask your healthcare provider how much liquid to drink each day and which liquids are best for you  · Keep a diary of your abdominal pain  A diary may help your healthcare provider learn what is causing your abdominal pain  Include when the pain happens, how long it lasts, and what the pain feels like  Write down any other symptoms you have with abdominal pain   Also write down what you eat, and what symptoms you have after you eat  · Manage your stress  Stress may cause abdominal pain  Your healthcare provider may recommend relaxation techniques and deep breathing exercises to help decrease your stress  Your healthcare provider may recommend you talk to someone about your stress or anxiety, such as a counselor or a trusted friend  Get plenty of sleep and exercise regularly  · Limit or do not drink alcohol  Alcohol can make your abdominal pain worse  Ask your healthcare provider if it is safe for you to drink alcohol  Also ask how much is safe for you to drink  · Do not smoke  Nicotine and other chemicals in cigarettes can damage your esophagus and stomach  Ask your healthcare provider for information if you currently smoke and need help to quit  E-cigarettes or smokeless tobacco still contain nicotine  Talk to your healthcare provider before you use these products  Follow up with your doctor within 24 hours or as directed:  Write down your questions so you remember to ask them during your visits  © [employer] 2022 Information is for End User's use only and may not be sold, redistributed or otherwise used for commercial purposes  All illustrations and images included in CareNotes® are the copyrighted property of [employer]  or [facility] Zeina Cummings   The above information is an  only  It is not intended as medical advice for individual conditions or treatments  Talk to your doctor, nurse or pharmacist before following any medical regimen to see if it is safe and effective for you

## 2022-05-05 ENCOUNTER — OFFICE VISIT (OUTPATIENT)
Dept: FAMILY MEDICINE CLINIC | Facility: CLINIC | Age: 22
End: 2022-05-05
Payer: COMMERCIAL

## 2022-05-05 VITALS
HEART RATE: 97 BPM | DIASTOLIC BLOOD PRESSURE: 80 MMHG | BODY MASS INDEX: 34.06 KG/M2 | HEIGHT: 67 IN | SYSTOLIC BLOOD PRESSURE: 110 MMHG | WEIGHT: 217 LBS | OXYGEN SATURATION: 100 %

## 2022-05-05 DIAGNOSIS — F51.01 PRIMARY INSOMNIA: ICD-10-CM

## 2022-05-05 DIAGNOSIS — F41.9 ANXIETY AND DEPRESSION: Primary | ICD-10-CM

## 2022-05-05 DIAGNOSIS — F32.A ANXIETY AND DEPRESSION: Primary | ICD-10-CM

## 2022-05-05 PROCEDURE — 99214 OFFICE O/P EST MOD 30 MIN: CPT | Performed by: NURSE PRACTITIONER

## 2022-05-05 PROCEDURE — 1036F TOBACCO NON-USER: CPT | Performed by: NURSE PRACTITIONER

## 2022-05-05 PROCEDURE — 3008F BODY MASS INDEX DOCD: CPT | Performed by: NURSE PRACTITIONER

## 2022-05-05 NOTE — PROGRESS NOTES
Assessment/Plan:     Chronic Problems:  Anxiety and depression  Much better with increase of meds  Will return in 1 month for recheck  Advised to call with any issues  Primary insomnia  Continue trazodone and xanax as needed  Visit Diagnosis:  Diagnoses and all orders for this visit:    Anxiety and depression    Primary insomnia          Subjective:    Patient ID: Yayo Crain is a 24 y o  female  Patient presents for follow up on anxiety  Anxiety 4/10, manageable  Using xanax for sleep  Gi symptoms are much better now with protonix and sucralfate, she did see GI  She still has some nausea, but no longer vomiting  The following portions of the patient's history were reviewed and updated as appropriate: allergies, current medications, past family history, past medical history, past social history, past surgical history and problem list     Review of Systems   Constitutional: Negative for chills and fever  Respiratory: Negative for cough and shortness of breath  Cardiovascular: Negative for chest pain and palpitations  Gastrointestinal: Positive for nausea  Negative for constipation, diarrhea and vomiting  Neurological: Negative for light-headedness, numbness and headaches  Psychiatric/Behavioral: Negative for dysphoric mood and sleep disturbance  The patient is nervous/anxious            /80 (BP Location: Left arm, Patient Position: Sitting)   Pulse 97   Ht 5' 7" (1 702 m)   Wt 98 4 kg (217 lb)   SpO2 100%   BMI 33 99 kg/m²   Social History     Socioeconomic History    Marital status: Single     Spouse name: Not on file    Number of children: Not on file    Years of education: Not on file    Highest education level: Not on file   Occupational History    Not on file   Tobacco Use    Smoking status: Never Smoker    Smokeless tobacco: Never Used   Vaping Use    Vaping Use: Never used   Substance and Sexual Activity    Alcohol use: No    Drug use: No    Sexual activity: Yes     Partners: Male     Birth control/protection: OCP   Other Topics Concern    Not on file   Social History Narrative    Not on file     Social Determinants of Health     Financial Resource Strain: Not on file   Food Insecurity: Not on file   Transportation Needs: Not on file   Physical Activity: Not on file   Stress: Not on file   Social Connections: Not on file   Intimate Partner Violence: Not on file   Housing Stability: Not on file     Past Medical History:   Diagnosis Date    Anxiety     Depression     GERD (gastroesophageal reflux disease)     Migraine      Family History   Problem Relation Age of Onset    Breast cancer Mother 39    Hypertension Mother     Hypertension Father     Asthma Brother     Heart defect Brother      Past Surgical History:   Procedure Laterality Date    ANTERIOR CRUCIATE LIGAMENT REPAIR  2016       Current Outpatient Medications:     ALPRAZolam (XANAX) 0 5 mg tablet, Take 1 tablet (0 5 mg total) by mouth daily at bedtime as needed for anxiety, Disp: 30 tablet, Rfl: 0    busPIRone (BUSPAR) 15 mg tablet, Take 1 tablet (15 mg total) by mouth 2 (two) times a day, Disp: 60 tablet, Rfl: 0    escitalopram (Lexapro) 20 mg tablet, Take 1 tablet (20 mg total) by mouth daily, Disp: 30 tablet, Rfl: 0    ondansetron (ZOFRAN-ODT) 4 mg disintegrating tablet, Take 1 tablet (4 mg total) by mouth every 6 (six) hours as needed for nausea or vomiting, Disp: 20 tablet, Rfl: 0    pantoprazole (PROTONIX) 40 mg tablet, Take 1 tablet (40 mg total) by mouth 2 (two) times a day, Disp: 60 tablet, Rfl: 1    sucralfate (CARAFATE) 1 g/10 mL suspension, Take 10 mL (1 g total) by mouth 4 (four) times a day, Disp: 420 mL, Rfl: 0    traZODone (DESYREL) 50 mg tablet, Take 1 tablet (50 mg total) by mouth daily at bedtime as needed for sleep, Disp: 90 tablet, Rfl: 1    Allergies   Allergen Reactions    Nickel           Lab Review   No visits with results within 2 Month(s) from this visit     Latest known visit with results is:   Lab on 11/24/2020   Component Date Value    HIV-1/HIV-2 Ab 11/24/2020 Non-Reactive     WBC 11/24/2020 11 39*    RBC 11/24/2020 4 46     Hemoglobin 11/24/2020 13 7     Hematocrit 11/24/2020 41 1     MCV 11/24/2020 92     MCH 11/24/2020 30 7     MCHC 11/24/2020 33 3     RDW 11/24/2020 12 7     MPV 11/24/2020 10 9     Platelets 16/37/3991 317     nRBC 11/24/2020 0     Neutrophils Relative 11/24/2020 70     Immat GRANS % 11/24/2020 1     Lymphocytes Relative 11/24/2020 21     Monocytes Relative 11/24/2020 8     Eosinophils Relative 11/24/2020 0     Basophils Relative 11/24/2020 0     Neutrophils Absolute 11/24/2020 7 92*    Immature Grans Absolute 11/24/2020 0 08     Lymphocytes Absolute 11/24/2020 2 43     Monocytes Absolute 11/24/2020 0 89     Eosinophils Absolute 11/24/2020 0 03     Basophils Absolute 11/24/2020 0 04     TSH 3RD GENERATON 11/24/2020 1 159     Sodium 11/24/2020 140     Potassium 11/24/2020 4 1     Chloride 11/24/2020 103     CO2 11/24/2020 25     ANION GAP 11/24/2020 12     BUN 11/24/2020 12     Creatinine 11/24/2020 0 63     Glucose, Fasting 11/24/2020 76     Calcium 11/24/2020 9 7     AST 11/24/2020 9     ALT 11/24/2020 23     Alkaline Phosphatase 11/24/2020 50     Total Protein 11/24/2020 8 0     Albumin 11/24/2020 3 9     Total Bilirubin 11/24/2020 0 60     eGFR 11/24/2020 129     Cholesterol 11/24/2020 213*    Triglycerides 11/24/2020 84     HDL, Direct 11/24/2020 68     LDL Calculated 11/24/2020 128*    Non-HDL-Chol (CHOL-HDL) 11/24/2020 145     Lyme total antibody 11/24/2020 27     MARCELLE 11/24/2020 Negative     Rheumatoid Factor 11/24/2020 Negative     HLA B27 11/24/2020 Negative     IgA 11/24/2020 221     Gliadin IgA 11/24/2020 3     Gliadin IgG 11/24/2020 3     Tissue Transglut Ab IGG 11/24/2020 <2     TISSUE TRANSGLUTAMINASE * 11/24/2020 <2     Endomysial IgA 11/24/2020 Negative Imaging: No results found  Objective:     Physical Exam  Constitutional:       Appearance: She is well-developed  Cardiovascular:      Rate and Rhythm: Normal rate and regular rhythm  Heart sounds: Normal heart sounds  No murmur heard  Pulmonary:      Effort: Pulmonary effort is normal  No respiratory distress  Breath sounds: Normal breath sounds  Skin:     General: Skin is warm and dry  Neurological:      Mental Status: She is alert and oriented to person, place, and time  There are no Patient Instructions on file for this visit  LEONILA Thurston    Portions of the record may have been created with voice recognition software  Occasional wrong word or "sound a like" substitutions may have occurred due to the inherent limitations of voice recognition software  Read the chart carefully and recognize, using context, where substitutions have occurred

## 2022-05-05 NOTE — ASSESSMENT & PLAN NOTE
Much better with increase of meds  Will return in 1 month for recheck  Advised to call with any issues

## 2022-05-18 ENCOUNTER — HOSPITAL ENCOUNTER (OUTPATIENT)
Dept: ULTRASOUND IMAGING | Facility: HOSPITAL | Age: 22
Discharge: HOME | End: 2022-05-18
Payer: COMMERCIAL

## 2022-05-18 ENCOUNTER — APPOINTMENT (OUTPATIENT)
Dept: LAB | Facility: HOSPITAL | Age: 22
End: 2022-05-18
Payer: COMMERCIAL

## 2022-05-18 DIAGNOSIS — Z00.00 HEALTHCARE MAINTENANCE: ICD-10-CM

## 2022-05-18 DIAGNOSIS — F41.9 ANXIETY AND DEPRESSION: ICD-10-CM

## 2022-05-18 DIAGNOSIS — R04.0 FREQUENT NOSEBLEEDS: ICD-10-CM

## 2022-05-18 DIAGNOSIS — F51.01 PRIMARY INSOMNIA: ICD-10-CM

## 2022-05-18 DIAGNOSIS — R11.2 NAUSEA AND VOMITING, UNSPECIFIED VOMITING TYPE: ICD-10-CM

## 2022-05-18 DIAGNOSIS — F32.A ANXIETY AND DEPRESSION: ICD-10-CM

## 2022-05-18 LAB
ALBUMIN SERPL BCP-MCNC: 4.3 G/DL (ref 3.5–5)
ALP SERPL-CCNC: 53 U/L (ref 46–116)
ALT SERPL W P-5'-P-CCNC: 21 U/L (ref 12–78)
ANION GAP SERPL CALCULATED.3IONS-SCNC: 10 MMOL/L (ref 4–13)
AST SERPL W P-5'-P-CCNC: 13 U/L (ref 5–45)
BASOPHILS # BLD AUTO: 0.02 THOUSANDS/ΜL (ref 0–0.1)
BASOPHILS NFR BLD AUTO: 0 % (ref 0–1)
BILIRUB SERPL-MCNC: 0.91 MG/DL (ref 0.2–1)
BUN SERPL-MCNC: 16 MG/DL (ref 5–25)
CALCIUM SERPL-MCNC: 9.2 MG/DL (ref 8.3–10.1)
CHLORIDE SERPL-SCNC: 106 MMOL/L (ref 100–108)
CHOLEST SERPL-MCNC: 164 MG/DL
CO2 SERPL-SCNC: 25 MMOL/L (ref 21–32)
CREAT SERPL-MCNC: 0.67 MG/DL (ref 0.6–1.3)
EOSINOPHIL # BLD AUTO: 0.03 THOUSAND/ΜL (ref 0–0.61)
EOSINOPHIL NFR BLD AUTO: 1 % (ref 0–6)
ERYTHROCYTE [DISTWIDTH] IN BLOOD BY AUTOMATED COUNT: 12.9 % (ref 11.6–15.1)
GFR SERPL CREATININE-BSD FRML MDRD: 125 ML/MIN/1.73SQ M
GLUCOSE P FAST SERPL-MCNC: 105 MG/DL (ref 65–99)
HCT VFR BLD AUTO: 40.3 % (ref 34.8–46.1)
HDLC SERPL-MCNC: 72 MG/DL
HGB BLD-MCNC: 13.6 G/DL (ref 11.5–15.4)
IMM GRANULOCYTES # BLD AUTO: 0.02 THOUSAND/UL (ref 0–0.2)
IMM GRANULOCYTES NFR BLD AUTO: 0 % (ref 0–2)
INR PPP: 1.01 (ref 0.84–1.19)
LDLC SERPL CALC-MCNC: 84 MG/DL (ref 0–100)
LYMPHOCYTES # BLD AUTO: 1.05 THOUSANDS/ΜL (ref 0.6–4.47)
LYMPHOCYTES NFR BLD AUTO: 18 % (ref 14–44)
MCH RBC QN AUTO: 30.9 PG (ref 26.8–34.3)
MCHC RBC AUTO-ENTMCNC: 33.7 G/DL (ref 31.4–37.4)
MCV RBC AUTO: 92 FL (ref 82–98)
MONOCYTES # BLD AUTO: 0.38 THOUSAND/ΜL (ref 0.17–1.22)
MONOCYTES NFR BLD AUTO: 7 % (ref 4–12)
NEUTROPHILS # BLD AUTO: 4.22 THOUSANDS/ΜL (ref 1.85–7.62)
NEUTS SEG NFR BLD AUTO: 74 % (ref 43–75)
NONHDLC SERPL-MCNC: 92 MG/DL
NRBC BLD AUTO-RTO: 0 /100 WBCS
PLATELET # BLD AUTO: 306 THOUSANDS/UL (ref 149–390)
PMV BLD AUTO: 11 FL (ref 8.9–12.7)
POTASSIUM SERPL-SCNC: 4.1 MMOL/L (ref 3.5–5.3)
PROT SERPL-MCNC: 8.1 G/DL (ref 6.4–8.2)
PROTHROMBIN TIME: 12.9 SECONDS (ref 11.6–14.5)
RBC # BLD AUTO: 4.4 MILLION/UL (ref 3.81–5.12)
SODIUM SERPL-SCNC: 141 MMOL/L (ref 136–145)
TRIGL SERPL-MCNC: 40 MG/DL
TSH SERPL DL<=0.05 MIU/L-ACNC: 0.52 UIU/ML (ref 0.45–4.5)
WBC # BLD AUTO: 5.72 THOUSAND/UL (ref 4.31–10.16)

## 2022-05-18 PROCEDURE — 80061 LIPID PANEL: CPT

## 2022-05-18 PROCEDURE — 80053 COMPREHEN METABOLIC PANEL: CPT

## 2022-05-18 PROCEDURE — 76705 ECHO EXAM OF ABDOMEN: CPT

## 2022-05-18 PROCEDURE — 84443 ASSAY THYROID STIM HORMONE: CPT

## 2022-05-18 PROCEDURE — 85025 COMPLETE CBC W/AUTO DIFF WBC: CPT

## 2022-05-18 PROCEDURE — 85610 PROTHROMBIN TIME: CPT

## 2022-05-18 PROCEDURE — 36415 COLL VENOUS BLD VENIPUNCTURE: CPT

## 2022-05-19 ENCOUNTER — TELEPHONE (OUTPATIENT)
Dept: GASTROENTEROLOGY | Facility: CLINIC | Age: 22
End: 2022-05-19

## 2022-05-19 DIAGNOSIS — R11.2 NAUSEA AND VOMITING, UNSPECIFIED VOMITING TYPE: Primary | ICD-10-CM

## 2022-05-19 RX ORDER — TRAZODONE HYDROCHLORIDE 50 MG/1
50 TABLET ORAL
Qty: 90 TABLET | Refills: 0 | Status: SHIPPED | OUTPATIENT
Start: 2022-05-19 | End: 2022-07-18 | Stop reason: SDUPTHER

## 2022-05-19 RX ORDER — BUSPIRONE HYDROCHLORIDE 15 MG/1
15 TABLET ORAL 2 TIMES DAILY
Qty: 60 TABLET | Refills: 0 | Status: SHIPPED | OUTPATIENT
Start: 2022-05-19 | End: 2022-06-16 | Stop reason: SDUPTHER

## 2022-05-19 NOTE — TELEPHONE ENCOUNTER
Tricia Alvarado - patient called she received the results of her US right upper quadrant in her MyChart  Would like to speak with someone   Please call Svitlana Stevens at [phone] ty

## 2022-05-20 ENCOUNTER — TELEPHONE (OUTPATIENT)
Dept: FAMILY MEDICINE CLINIC | Facility: CLINIC | Age: 22
End: 2022-05-20

## 2022-05-20 NOTE — TELEPHONE ENCOUNTER
----- Message from LEONILA Palma sent at 5/19/2022  5:07 PM EDT -----  Overall labs look good, fasting glucose is slightly elevated---work on the weight loss and exercise

## 2022-05-24 DIAGNOSIS — R11.0 NAUSEA: ICD-10-CM

## 2022-05-24 RX ORDER — ONDANSETRON 4 MG/1
4 TABLET, ORALLY DISINTEGRATING ORAL EVERY 6 HOURS PRN
Qty: 20 TABLET | Refills: 0 | Status: SHIPPED | OUTPATIENT
Start: 2022-05-24

## 2022-06-10 DIAGNOSIS — F41.9 ANXIETY AND DEPRESSION: ICD-10-CM

## 2022-06-10 DIAGNOSIS — F32.A ANXIETY AND DEPRESSION: ICD-10-CM

## 2022-06-13 RX ORDER — ESCITALOPRAM OXALATE 20 MG/1
20 TABLET ORAL DAILY
Qty: 30 TABLET | Refills: 0 | Status: SHIPPED | OUTPATIENT
Start: 2022-06-13 | End: 2022-07-09 | Stop reason: SDUPTHER

## 2022-06-14 ENCOUNTER — TELEPHONE (OUTPATIENT)
Dept: GASTROENTEROLOGY | Facility: CLINIC | Age: 22
End: 2022-06-14

## 2022-06-14 DIAGNOSIS — K58.2 IRRITABLE BOWEL SYNDROME WITH BOTH CONSTIPATION AND DIARRHEA: Primary | ICD-10-CM

## 2022-06-14 DIAGNOSIS — R11.2 NAUSEA AND VOMITING, UNSPECIFIED VOMITING TYPE: ICD-10-CM

## 2022-06-14 NOTE — TELEPHONE ENCOUNTER
GES on June 28th    Called pt to advise  Hida scan ordered  Pt stated there is more blood from her rectum and she had to leave work today because it was all over her pants      Routing to [place]

## 2022-06-14 NOTE — TELEPHONE ENCOUNTER
FYI: Spoke with patient  History of IBS, nausea, GERD    Patient c/o continued nausea, vomiting, diarrhea 6-7 BM daily for 3 days, and BRBPR when wiping  Patient has GES scheduled, and will have HIDA scan scheduled also  Taking pantoprazole 40mg BID, following GERD diet  Patient will have stool studies done, and start prepH PRN

## 2022-06-14 NOTE — TELEPHONE ENCOUNTER
As per KRISH Ortega PA-C  to Gastroenterology [address] [facility]          6/14/22 9:03 AM  Please inform patient I would like her to also do a HIDA scan  And please find out when her GES is scheduled for thank you!

## 2022-06-14 NOTE — TELEPHONE ENCOUNTER
Regarding: FW: Reoccurring symptoms  Please inform patient I would like her to also do a HIDA scan  And please find out when her GES is scheduled for thank you!  ----- Message -----  From: Gena Alfaro  Sent: 6/14/2022   8:37 AM EDT  To: Zeinab Sheehan PA-C  Subject: Reoccurring symptoms                             ----- Message from Gena Alfaro sent at 6/14/2022  8:37 AM EDT -----  Patient left [facility] message  Please advise  Thank You!     ----- Message from Maribel Mosquera to Zeinab Sheehan PA-C sent at 6/14/2022  7:57 AM -----   Amina Tarango throwing up again and I dont know why  Im taking my protonix regularly and monitoring my diet  I threw up twice this morning very roughly and twice yesterday morning  There is also some blood in my stool  Its not a lot, but enough to make me concerned  I do not know what to do anymore

## 2022-06-16 DIAGNOSIS — F32.A ANXIETY AND DEPRESSION: ICD-10-CM

## 2022-06-16 DIAGNOSIS — F41.9 ANXIETY AND DEPRESSION: ICD-10-CM

## 2022-06-17 RX ORDER — BUSPIRONE HYDROCHLORIDE 15 MG/1
15 TABLET ORAL 2 TIMES DAILY
Qty: 60 TABLET | Refills: 0 | Status: SHIPPED | OUTPATIENT
Start: 2022-06-17 | End: 2022-07-18 | Stop reason: SDUPTHER

## 2022-06-28 ENCOUNTER — HOSPITAL ENCOUNTER (OUTPATIENT)
Dept: NUCLEAR MEDICINE | Facility: HOSPITAL | Age: 22
Discharge: HOME | End: 2022-06-28
Payer: COMMERCIAL

## 2022-06-28 DIAGNOSIS — R11.2 NAUSEA AND VOMITING, UNSPECIFIED VOMITING TYPE: ICD-10-CM

## 2022-06-28 PROCEDURE — A9541 TC99M SULFUR COLLOID: HCPCS

## 2022-06-28 PROCEDURE — G1004 CDSM NDSC: HCPCS

## 2022-06-28 PROCEDURE — 78264 GASTRIC EMPTYING IMG STUDY: CPT

## 2022-06-29 ENCOUNTER — TELEPHONE (OUTPATIENT)
Dept: GASTROENTEROLOGY | Facility: CLINIC | Age: 22
End: 2022-06-29

## 2022-06-29 NOTE — TELEPHONE ENCOUNTER
----- Message from Wendy Cheema PA-C sent at 6/28/2022  3:46 PM EDT -----  Please inform patient that her gastric emptying study is normal   Thank you

## 2022-07-05 DIAGNOSIS — K21.9 GASTROESOPHAGEAL REFLUX DISEASE WITHOUT ESOPHAGITIS: ICD-10-CM

## 2022-07-05 RX ORDER — PANTOPRAZOLE SODIUM 40 MG/1
40 TABLET, DELAYED RELEASE ORAL 2 TIMES DAILY
Qty: 60 TABLET | Refills: 1 | Status: SHIPPED | OUTPATIENT
Start: 2022-07-05 | End: 2022-09-13 | Stop reason: SDUPTHER

## 2022-07-09 DIAGNOSIS — F41.9 ANXIETY AND DEPRESSION: ICD-10-CM

## 2022-07-09 DIAGNOSIS — F32.A ANXIETY AND DEPRESSION: ICD-10-CM

## 2022-07-11 RX ORDER — ESCITALOPRAM OXALATE 20 MG/1
20 TABLET ORAL DAILY
Qty: 30 TABLET | Refills: 0 | Status: SHIPPED | OUTPATIENT
Start: 2022-07-11 | End: 2022-08-11 | Stop reason: SDUPTHER

## 2022-07-18 DIAGNOSIS — F32.A ANXIETY AND DEPRESSION: ICD-10-CM

## 2022-07-18 DIAGNOSIS — F51.01 PRIMARY INSOMNIA: ICD-10-CM

## 2022-07-18 DIAGNOSIS — F41.9 ANXIETY AND DEPRESSION: ICD-10-CM

## 2022-07-18 RX ORDER — BUSPIRONE HYDROCHLORIDE 15 MG/1
15 TABLET ORAL 2 TIMES DAILY
Qty: 60 TABLET | Refills: 0 | Status: SHIPPED | OUTPATIENT
Start: 2022-07-18 | End: 2022-08-15 | Stop reason: SDUPTHER

## 2022-07-18 RX ORDER — TRAZODONE HYDROCHLORIDE 50 MG/1
50 TABLET ORAL
Qty: 90 TABLET | Refills: 0 | Status: SHIPPED | OUTPATIENT
Start: 2022-07-18

## 2022-07-26 ENCOUNTER — HOSPITAL ENCOUNTER (OUTPATIENT)
Dept: NUCLEAR MEDICINE | Facility: HOSPITAL | Age: 22
Discharge: HOME | End: 2022-07-26
Payer: COMMERCIAL

## 2022-07-26 DIAGNOSIS — K58.2 IRRITABLE BOWEL SYNDROME WITH BOTH CONSTIPATION AND DIARRHEA: ICD-10-CM

## 2022-07-26 DIAGNOSIS — R11.2 NAUSEA AND VOMITING, UNSPECIFIED VOMITING TYPE: ICD-10-CM

## 2022-07-26 PROCEDURE — A9537 TC99M MEBROFENIN: HCPCS

## 2022-07-26 PROCEDURE — 78227 HEPATOBIL SYST IMAGE W/DRUG: CPT

## 2022-07-26 PROCEDURE — G1004 CDSM NDSC: HCPCS

## 2022-07-26 RX ADMIN — SINCALIDE 2 MCG: 5 INJECTION, POWDER, LYOPHILIZED, FOR SOLUTION INTRAVENOUS at 09:50

## 2022-08-04 ENCOUNTER — TELEPHONE (OUTPATIENT)
Dept: GASTROENTEROLOGY | Facility: CLINIC | Age: 22
End: 2022-08-04

## 2022-08-04 NOTE — TELEPHONE ENCOUNTER
The US resulted and results were given  GES was normal, results given  HIDA scan has not resulted as of yet  Called radiology and was told they can't find it  There're looking into it and will call back

## 2022-08-04 NOTE — TELEPHONE ENCOUNTER
----- Message from Arlet Schneider sent at 8/4/2022 11:25 AM EDT -----  Regarding: Abdominal scan results   [place]! I just wanted to reach out and touch base on the results on my scan last week since I havent heard anything or seen anything appear on My Chart  Im also still having symptoms especially this week including vomiting daily, slight loss of appetite, and sharp pains in my left side

## 2022-08-08 ENCOUNTER — TELEPHONE (OUTPATIENT)
Dept: GASTROENTEROLOGY | Facility: CLINIC | Age: 22
End: 2022-08-08

## 2022-08-08 NOTE — TELEPHONE ENCOUNTER
----- Message from Simón Harrison PA-C sent at 8/6/2022 11:49 AM EDT -----  Please advise patient this was a normal exam

## 2022-08-11 DIAGNOSIS — F32.A ANXIETY AND DEPRESSION: ICD-10-CM

## 2022-08-11 DIAGNOSIS — F41.9 ANXIETY AND DEPRESSION: ICD-10-CM

## 2022-08-12 RX ORDER — ESCITALOPRAM OXALATE 20 MG/1
20 TABLET ORAL DAILY
Qty: 30 TABLET | Refills: 0 | Status: SHIPPED | OUTPATIENT
Start: 2022-08-12 | End: 2022-09-13 | Stop reason: SDUPTHER

## 2022-08-15 ENCOUNTER — OFFICE VISIT (OUTPATIENT)
Dept: FAMILY MEDICINE CLINIC | Facility: CLINIC | Age: 22
End: 2022-08-15
Payer: COMMERCIAL

## 2022-08-15 VITALS
DIASTOLIC BLOOD PRESSURE: 82 MMHG | SYSTOLIC BLOOD PRESSURE: 122 MMHG | WEIGHT: 214 LBS | HEIGHT: 67 IN | HEART RATE: 88 BPM | BODY MASS INDEX: 33.59 KG/M2 | OXYGEN SATURATION: 98 %

## 2022-08-15 DIAGNOSIS — R53.83 OTHER FATIGUE: ICD-10-CM

## 2022-08-15 DIAGNOSIS — F41.9 ANXIETY AND DEPRESSION: Primary | ICD-10-CM

## 2022-08-15 DIAGNOSIS — F32.A ANXIETY AND DEPRESSION: Primary | ICD-10-CM

## 2022-08-15 PROCEDURE — 99214 OFFICE O/P EST MOD 30 MIN: CPT | Performed by: NURSE PRACTITIONER

## 2022-08-15 RX ORDER — BUSPIRONE HYDROCHLORIDE 15 MG/1
15 TABLET ORAL 3 TIMES DAILY
Qty: 90 TABLET | Refills: 0 | Status: SHIPPED | OUTPATIENT
Start: 2022-08-15 | End: 2022-09-08

## 2022-08-15 RX ORDER — BUPROPION HYDROCHLORIDE 150 MG/1
150 TABLET ORAL EVERY MORNING
Qty: 90 TABLET | Refills: 3 | Status: SHIPPED | OUTPATIENT
Start: 2022-08-15 | End: 2022-09-13 | Stop reason: SDUPTHER

## 2022-08-15 RX ORDER — ALPRAZOLAM 0.5 MG/1
0.5 TABLET ORAL
Qty: 30 TABLET | Refills: 0 | Status: SHIPPED | OUTPATIENT
Start: 2022-08-15 | End: 2022-09-13 | Stop reason: SDUPTHER

## 2022-08-15 NOTE — PROGRESS NOTES
Assessment/Plan:     Chronic Problems:  Anxiety and depression    Will increase BuSpar to 15 mg 3 times daily  Advised to use Xanax very sparingly as needed  Continue Lexapro and will add on Wellbutrin  Will return in 1 month  Visit Diagnosis:  Diagnoses and all orders for this visit:    Anxiety and depression  -     busPIRone (BUSPAR) 15 mg tablet; Take 1 tablet (15 mg total) by mouth 3 (three) times a day  -     buPROPion (Wellbutrin XL) 150 mg 24 hr tablet; Take 1 tablet (150 mg total) by mouth every morning  -     ALPRAZolam (XANAX) 0 5 mg tablet; Take 1 tablet (0 5 mg total) by mouth daily at bedtime as needed for anxiety    Other fatigue  Comments:    Obtain workup  Orders:  -     CBC and differential; Future  -     TSH, 3rd generation with Free T4 reflex; Future  -     Vitamin D 25 hydroxy; Future  -     Lyme Antibody Profile with reflex to WB; Future          Subjective:    Patient ID: Annie Camacho is a 24 y o  female  Patient presents with concerns of worsening anxiety and depression  Starting to lose motivation again, very tired  Falling asleep at work  Has no energy  She does work long hours, 10 hours/day  Anxiety 7-8/10  Depression 4-5/10  She is waking up about 3 times at night and takes her about 30-45 minutes to fall back asleep  The following portions of the patient's history were reviewed and updated as appropriate: allergies, current medications, past family history, past medical history, past social history, past surgical history and problem list     Review of Systems   Constitutional: Positive for fatigue  Negative for chills and fever  Respiratory: Negative  Cardiovascular: Negative  Gastrointestinal: Positive for nausea and vomiting  Negative for constipation and diarrhea  Psychiatric/Behavioral: Positive for dysphoric mood and sleep disturbance (waking 3 times a night)  The patient is nervous/anxious            /82   Pulse 88   Ht 5' 7" (1 702 m)   Wt 97 1 kg (214 lb)   SpO2 98%   BMI 33 52 kg/m²   Social History     Socioeconomic History    Marital status: Single     Spouse name: Not on file    Number of children: Not on file    Years of education: Not on file    Highest education level: Not on file   Occupational History    Not on file   Tobacco Use    Smoking status: Never Smoker    Smokeless tobacco: Never Used   Vaping Use    Vaping Use: Never used   Substance and Sexual Activity    Alcohol use: No    Drug use: No    Sexual activity: Yes     Partners: Male     Birth control/protection: OCP   Other Topics Concern    Not on file   Social History Narrative    Not on file     Social Determinants of Health     Financial Resource Strain: Not on file   Food Insecurity: Not on file   Transportation Needs: Not on file   Physical Activity: Not on file   Stress: Not on file   Social Connections: Not on file   Intimate Partner Violence: Not on file   Housing Stability: Not on file     Past Medical History:   Diagnosis Date    Anxiety     Depression     GERD (gastroesophageal reflux disease)     Migraine      Family History   Problem Relation Age of Onset    Breast cancer Mother 39    Hypertension Mother     Hypertension Father     Asthma Brother     Heart defect Brother      Past Surgical History:   Procedure Laterality Date    ANTERIOR CRUCIATE LIGAMENT REPAIR  2016       Current Outpatient Medications:     ALPRAZolam (XANAX) 0 5 mg tablet, Take 1 tablet (0 5 mg total) by mouth daily at bedtime as needed for anxiety, Disp: 30 tablet, Rfl: 0    buPROPion (Wellbutrin XL) 150 mg 24 hr tablet, Take 1 tablet (150 mg total) by mouth every morning, Disp: 90 tablet, Rfl: 3    busPIRone (BUSPAR) 15 mg tablet, Take 1 tablet (15 mg total) by mouth 3 (three) times a day, Disp: 90 tablet, Rfl: 0    escitalopram (Lexapro) 20 mg tablet, Take 1 tablet (20 mg total) by mouth daily, Disp: 30 tablet, Rfl: 0    ondansetron (ZOFRAN-ODT) 4 mg disintegrating tablet, Take 1 tablet (4 mg total) by mouth every 6 (six) hours as needed for nausea or vomiting, Disp: 20 tablet, Rfl: 0    pantoprazole (PROTONIX) 40 mg tablet, Take 1 tablet (40 mg total) by mouth 2 (two) times a day, Disp: 60 tablet, Rfl: 1    sucralfate (CARAFATE) 1 g/10 mL suspension, Take 10 mL (1 g total) by mouth 4 (four) times a day, Disp: 420 mL, Rfl: 0    traZODone (DESYREL) 50 mg tablet, Take 1 tablet (50 mg total) by mouth daily at bedtime as needed for sleep, Disp: 90 tablet, Rfl: 0    Allergies   Allergen Reactions    Nickel           Lab Review   No visits with results within 2 Month(s) from this visit     Latest known visit with results is:   Appointment on 05/18/2022   Component Date Value    WBC 05/18/2022 5 72     RBC 05/18/2022 4 40     Hemoglobin 05/18/2022 13 6     Hematocrit 05/18/2022 40 3     MCV 05/18/2022 92     MCH 05/18/2022 30 9     MCHC 05/18/2022 33 7     RDW 05/18/2022 12 9     MPV 05/18/2022 11 0     Platelets 38/81/7038 306     nRBC 05/18/2022 0     Neutrophils Relative 05/18/2022 74     Immat GRANS % 05/18/2022 0     Lymphocytes Relative 05/18/2022 18     Monocytes Relative 05/18/2022 7     Eosinophils Relative 05/18/2022 1     Basophils Relative 05/18/2022 0     Neutrophils Absolute 05/18/2022 4 22     Immature Grans Absolute 05/18/2022 0 02     Lymphocytes Absolute 05/18/2022 1 05     Monocytes Absolute 05/18/2022 0 38     Eosinophils Absolute 05/18/2022 0 03     Basophils Absolute 05/18/2022 0 02     Sodium 05/18/2022 141     Potassium 05/18/2022 4 1     Chloride 05/18/2022 106     CO2 05/18/2022 25     ANION GAP 05/18/2022 10     BUN 05/18/2022 16     Creatinine 05/18/2022 0 67     Glucose, Fasting 05/18/2022 105 (A)    Calcium 05/18/2022 9 2     AST 05/18/2022 13     ALT 05/18/2022 21     Alkaline Phosphatase 05/18/2022 53     Total Protein 05/18/2022 8 1     Albumin 05/18/2022 4 3     Total Bilirubin 05/18/2022 0 91  eGFR 05/18/2022 125     Cholesterol 05/18/2022 164     Triglycerides 05/18/2022 40     HDL, Direct 05/18/2022 72     LDL Calculated 05/18/2022 84     Non-HDL-Chol (CHOL-HDL) 05/18/2022 92     TSH 3RD GENERATON 05/18/2022 0 523     Protime 05/18/2022 12 9     INR 05/18/2022 1 01         Imaging: NM hepatobiliary w rx    Result Date: 8/4/2022  Narrative: HEPATOBILIARY SCAN WITH CHOLECYSTOKININ ADMINISTRATION INDICATION: R11 2: Nausea with vomiting, unspecified K58 2: Mixed irritable bowel syndrome COMPARISON:  Ultrasound 5/18/2022 TECHNIQUE:   Following the intravenous administration of 4 8 mCi Tc-99m labeled mebrofenin, dynamic abdominal images were obtained up to a 60 minute time period  Images were performed in AP projection  FINDINGS: There is prompt, uniform accumulation with normal clearance of the radiopharmaceutical by the liver  There is normal filling of the intrahepatic ducts, common bile duct and gallbladder with normal excretion of the radiopharmaceutical into the duodenum  In order to evaluate the contractile response of the gallbladder to  cholecystokinin stimulation, 2 mcg sincalide (0 02 mcg/kg) was mixed with saline for a total of 60 cc and administered by slow intravenous infusion up to 60 minutes  These images demonstrate normal contraction of the gallbladder  The calculated gallbladder ejection fraction is 75 % (N = >38%)  The patient experienced no symptoms after CCK administration  Impression: 1  Normal hepatobiliary study  2  Normal contractile response of the gallbladder to cholecystokinin infusion   (75%) Workstation performed: [id number]       Objective:     Physical Exam  Constitutional:       Appearance: She is well-developed  Cardiovascular:      Rate and Rhythm: Normal rate and regular rhythm  Heart sounds: Normal heart sounds  No murmur heard  Pulmonary:      Effort: Pulmonary effort is normal  No respiratory distress        Breath sounds: Normal breath sounds  Skin:     General: Skin is warm and dry  Neurological:      Mental Status: She is alert and oriented to person, place, and time  There are no Patient Instructions on file for this visit  LEONILA Thurston    Portions of the record may have been created with voice recognition software  Occasional wrong word or "sound a like" substitutions may have occurred due to the inherent limitations of voice recognition software  Read the chart carefully and recognize, using context, where substitutions have occurred

## 2022-08-15 NOTE — ASSESSMENT & PLAN NOTE
Will increase BuSpar to 15 mg 3 times daily  Advised to use Xanax very sparingly as needed  Continue Lexapro and will add on Wellbutrin  Will return in 1 month

## 2022-09-07 DIAGNOSIS — F41.9 ANXIETY AND DEPRESSION: ICD-10-CM

## 2022-09-07 DIAGNOSIS — F32.A ANXIETY AND DEPRESSION: ICD-10-CM

## 2022-09-08 RX ORDER — BUSPIRONE HYDROCHLORIDE 15 MG/1
TABLET ORAL
Qty: 270 TABLET | Refills: 1 | Status: SHIPPED | OUTPATIENT
Start: 2022-09-08 | End: 2022-10-05 | Stop reason: SDUPTHER

## 2022-09-13 ENCOUNTER — OFFICE VISIT (OUTPATIENT)
Dept: FAMILY MEDICINE CLINIC | Facility: CLINIC | Age: 22
End: 2022-09-13
Payer: COMMERCIAL

## 2022-09-13 VITALS
HEIGHT: 67 IN | OXYGEN SATURATION: 98 % | DIASTOLIC BLOOD PRESSURE: 70 MMHG | TEMPERATURE: 97.1 F | SYSTOLIC BLOOD PRESSURE: 120 MMHG | WEIGHT: 223.2 LBS | BODY MASS INDEX: 35.03 KG/M2 | HEART RATE: 101 BPM

## 2022-09-13 DIAGNOSIS — K21.9 GASTROESOPHAGEAL REFLUX DISEASE WITHOUT ESOPHAGITIS: ICD-10-CM

## 2022-09-13 DIAGNOSIS — F32.A ANXIETY AND DEPRESSION: ICD-10-CM

## 2022-09-13 DIAGNOSIS — F41.9 ANXIETY AND DEPRESSION: ICD-10-CM

## 2022-09-13 PROCEDURE — 99214 OFFICE O/P EST MOD 30 MIN: CPT | Performed by: NURSE PRACTITIONER

## 2022-09-13 RX ORDER — BUPROPION HYDROCHLORIDE 300 MG/1
300 TABLET ORAL EVERY MORNING
Qty: 90 TABLET | Refills: 0 | Status: SHIPPED | OUTPATIENT
Start: 2022-09-13 | End: 2022-10-11 | Stop reason: SDUPTHER

## 2022-09-13 RX ORDER — PANTOPRAZOLE SODIUM 40 MG/1
40 TABLET, DELAYED RELEASE ORAL 2 TIMES DAILY
Qty: 180 TABLET | Refills: 1 | Status: SHIPPED | OUTPATIENT
Start: 2022-09-13 | End: 2022-10-11 | Stop reason: SDUPTHER

## 2022-09-13 RX ORDER — ESCITALOPRAM OXALATE 20 MG/1
20 TABLET ORAL DAILY
Qty: 90 TABLET | Refills: 1 | Status: SHIPPED | OUTPATIENT
Start: 2022-09-13 | End: 2022-10-11 | Stop reason: SDUPTHER

## 2022-09-13 RX ORDER — ALPRAZOLAM 0.5 MG/1
0.5 TABLET ORAL
Qty: 30 TABLET | Refills: 0 | Status: SHIPPED | OUTPATIENT
Start: 2022-09-13 | End: 2022-10-12 | Stop reason: SDUPTHER

## 2022-09-13 NOTE — PROGRESS NOTES
BMI Counseling: Body mass index is 34 96 kg/m²  The BMI is above normal  Nutrition recommendations include decreasing portion sizes and moderation in carbohydrate intake  Exercise recommendations include moderate physical activity 150 minutes/week and exercising 3-5 times per week  Rationale for BMI follow-up plan is due to patient being overweight or obese  Assessment/Plan:     Chronic Problems:  Gastroesophageal reflux disease without esophagitis  Try to take pantoprazole once daily  Would like to try to get her off of this if possible  Anxiety and depression  Doing much better with anxiety and depression  Continue Lexapro, BuSpar  Increase Wellbutrin to 300 mg daily  Continue Xanax sparingly as needed  Visit Diagnosis:  Diagnoses and all orders for this visit:    Anxiety and depression  -     ALPRAZolam (XANAX) 0 5 mg tablet; Take 1 tablet (0 5 mg total) by mouth daily at bedtime as needed for anxiety  -     buPROPion (Wellbutrin XL) 300 mg 24 hr tablet; Take 1 tablet (300 mg total) by mouth every morning  -     escitalopram (Lexapro) 20 mg tablet; Take 1 tablet (20 mg total) by mouth daily    Gastroesophageal reflux disease without esophagitis  -     pantoprazole (PROTONIX) 40 mg tablet; Take 1 tablet (40 mg total) by mouth 2 (two) times a day          Subjective:    Patient ID: Bina Varela is a 20 y o  female  Patient presents for follow-up on depression and anxiety  Patient feels she is doing better with change of medication  She feels the Wellbutrin her energy level  She is night to sleep which is working well for her  Anxiety 5/10, depression is better 2/10, but still having not motivation        The following portions of the patient's history were reviewed and updated as appropriate: allergies, current medications, past family history, past medical history, past social history, past surgical history and problem list     Review of Systems   Constitutional: Negative for chills and fever  HENT: Negative for ear pain and sore throat  Eyes: Negative for pain and visual disturbance  Respiratory: Negative for cough and shortness of breath  Cardiovascular: Negative for chest pain and palpitations  Gastrointestinal: Negative for abdominal pain and vomiting  Genitourinary: Negative for dysuria and hematuria  Musculoskeletal: Negative for arthralgias and back pain  Skin: Negative for color change and rash  Neurological: Negative for seizures and syncope  Psychiatric/Behavioral: Positive for dysphoric mood  Negative for sleep disturbance  The patient is nervous/anxious  All other systems reviewed and are negative          /70   Pulse 101   Temp (!) 97 1 °F (36 2 °C) (Tympanic)   Ht 5' 7" (1 702 m)   Wt 101 kg (223 lb 3 2 oz)   SpO2 98%   BMI 34 96 kg/m²   Social History     Socioeconomic History    Marital status: Single     Spouse name: Not on file    Number of children: Not on file    Years of education: Not on file    Highest education level: Not on file   Occupational History    Not on file   Tobacco Use    Smoking status: Never Smoker    Smokeless tobacco: Never Used   Vaping Use    Vaping Use: Never used   Substance and Sexual Activity    Alcohol use: No    Drug use: No    Sexual activity: Yes     Partners: Male     Birth control/protection: OCP   Other Topics Concern    Not on file   Social History Narrative    Not on file     Social Determinants of Health     Financial Resource Strain: Not on file   Food Insecurity: Not on file   Transportation Needs: Not on file   Physical Activity: Not on file   Stress: Not on file   Social Connections: Not on file   Intimate Partner Violence: Not on file   Housing Stability: Not on file     Past Medical History:   Diagnosis Date    Anxiety     Depression     GERD (gastroesophageal reflux disease)     Migraine      Family History   Problem Relation Age of Onset    Breast cancer Mother 39    Hypertension Mother     Hypertension Father     Asthma Brother     Heart defect Brother      Past Surgical History:   Procedure Laterality Date    ANTERIOR CRUCIATE LIGAMENT REPAIR  2016       Current Outpatient Medications:     ALPRAZolam (XANAX) 0 5 mg tablet, Take 1 tablet (0 5 mg total) by mouth daily at bedtime as needed for anxiety, Disp: 30 tablet, Rfl: 0    buPROPion (Wellbutrin XL) 300 mg 24 hr tablet, Take 1 tablet (300 mg total) by mouth every morning, Disp: 90 tablet, Rfl: 0    busPIRone (BUSPAR) 15 mg tablet, TAKE 1 TABLET BY MOUTH 3 TIMES A DAY , Disp: 270 tablet, Rfl: 1    escitalopram (Lexapro) 20 mg tablet, Take 1 tablet (20 mg total) by mouth daily, Disp: 90 tablet, Rfl: 1    ondansetron (ZOFRAN-ODT) 4 mg disintegrating tablet, Take 1 tablet (4 mg total) by mouth every 6 (six) hours as needed for nausea or vomiting, Disp: 20 tablet, Rfl: 0    pantoprazole (PROTONIX) 40 mg tablet, Take 1 tablet (40 mg total) by mouth 2 (two) times a day, Disp: 180 tablet, Rfl: 1    sucralfate (CARAFATE) 1 g/10 mL suspension, Take 10 mL (1 g total) by mouth 4 (four) times a day, Disp: 420 mL, Rfl: 0    traZODone (DESYREL) 50 mg tablet, Take 1 tablet (50 mg total) by mouth daily at bedtime as needed for sleep, Disp: 90 tablet, Rfl: 0    Allergies   Allergen Reactions    Nickel           Lab Review   No visits with results within 2 Month(s) from this visit     Latest known visit with results is:   Appointment on 05/18/2022   Component Date Value    WBC 05/18/2022 5 72     RBC 05/18/2022 4 40     Hemoglobin 05/18/2022 13 6     Hematocrit 05/18/2022 40 3     MCV 05/18/2022 92     MCH 05/18/2022 30 9     MCHC 05/18/2022 33 7     RDW 05/18/2022 12 9     MPV 05/18/2022 11 0     Platelets 05/34/1695 306     nRBC 05/18/2022 0     Neutrophils Relative 05/18/2022 74     Immat GRANS % 05/18/2022 0     Lymphocytes Relative 05/18/2022 18     Monocytes Relative 05/18/2022 7     Eosinophils Relative 05/18/2022 1     Basophils Relative 05/18/2022 0     Neutrophils Absolute 05/18/2022 4 22     Immature Grans Absolute 05/18/2022 0 02     Lymphocytes Absolute 05/18/2022 1 05     Monocytes Absolute 05/18/2022 0 38     Eosinophils Absolute 05/18/2022 0 03     Basophils Absolute 05/18/2022 0 02     Sodium 05/18/2022 141     Potassium 05/18/2022 4 1     Chloride 05/18/2022 106     CO2 05/18/2022 25     ANION GAP 05/18/2022 10     BUN 05/18/2022 16     Creatinine 05/18/2022 0 67     Glucose, Fasting 05/18/2022 105 (A)    Calcium 05/18/2022 9 2     AST 05/18/2022 13     ALT 05/18/2022 21     Alkaline Phosphatase 05/18/2022 53     Total Protein 05/18/2022 8 1     Albumin 05/18/2022 4 3     Total Bilirubin 05/18/2022 0 91     eGFR 05/18/2022 125     Cholesterol 05/18/2022 164     Triglycerides 05/18/2022 40     HDL, Direct 05/18/2022 72     LDL Calculated 05/18/2022 84     Non-HDL-Chol (CHOL-HDL) 05/18/2022 92     TSH 3RD GENERATON 05/18/2022 0 523     Protime 05/18/2022 12 9     INR 05/18/2022 1 01         Imaging: No results found  Objective:     Physical Exam  Constitutional:       Appearance: She is well-developed  Cardiovascular:      Rate and Rhythm: Normal rate and regular rhythm  Heart sounds: Normal heart sounds  No murmur heard  Pulmonary:      Effort: Pulmonary effort is normal  No respiratory distress  Breath sounds: Normal breath sounds  Skin:     General: Skin is warm and dry  Neurological:      Mental Status: She is alert and oriented to person, place, and time  There are no Patient Instructions on file for this visit  LEONILA Thurston    Portions of the record may have been created with voice recognition software  Occasional wrong word or "sound a like" substitutions may have occurred due to the inherent limitations of voice recognition software  Read the chart carefully and recognize, using context, where substitutions have occurred

## 2022-09-13 NOTE — ASSESSMENT & PLAN NOTE
Doing much better with anxiety and depression  Continue Lexapro, BuSpar  Increase Wellbutrin to 300 mg daily  Continue Xanax sparingly as needed

## 2022-09-28 ENCOUNTER — APPOINTMENT (OUTPATIENT)
Dept: LAB | Facility: HOSPITAL | Age: 22
End: 2022-09-28
Payer: COMMERCIAL

## 2022-09-28 DIAGNOSIS — R53.83 OTHER FATIGUE: ICD-10-CM

## 2022-09-28 LAB
25(OH)D3 SERPL-MCNC: 30.6 NG/ML (ref 30–100)
BASOPHILS # BLD AUTO: 0.03 THOUSANDS/ΜL (ref 0–0.1)
BASOPHILS NFR BLD AUTO: 0 % (ref 0–1)
EOSINOPHIL # BLD AUTO: 0.08 THOUSAND/ΜL (ref 0–0.61)
EOSINOPHIL NFR BLD AUTO: 1 % (ref 0–6)
ERYTHROCYTE [DISTWIDTH] IN BLOOD BY AUTOMATED COUNT: 12.6 % (ref 11.6–15.1)
HCT VFR BLD AUTO: 37.5 % (ref 34.8–46.1)
HGB BLD-MCNC: 12.5 G/DL (ref 11.5–15.4)
IMM GRANULOCYTES # BLD AUTO: 0.03 THOUSAND/UL (ref 0–0.2)
IMM GRANULOCYTES NFR BLD AUTO: 0 % (ref 0–2)
LYMPHOCYTES # BLD AUTO: 1.87 THOUSANDS/ΜL (ref 0.6–4.47)
LYMPHOCYTES NFR BLD AUTO: 22 % (ref 14–44)
MCH RBC QN AUTO: 30.9 PG (ref 26.8–34.3)
MCHC RBC AUTO-ENTMCNC: 33.3 G/DL (ref 31.4–37.4)
MCV RBC AUTO: 93 FL (ref 82–98)
MONOCYTES # BLD AUTO: 0.61 THOUSAND/ΜL (ref 0.17–1.22)
MONOCYTES NFR BLD AUTO: 7 % (ref 4–12)
NEUTROPHILS # BLD AUTO: 5.92 THOUSANDS/ΜL (ref 1.85–7.62)
NEUTS SEG NFR BLD AUTO: 70 % (ref 43–75)
NRBC BLD AUTO-RTO: 0 /100 WBCS
PLATELET # BLD AUTO: 272 THOUSANDS/UL (ref 149–390)
PMV BLD AUTO: 11.1 FL (ref 8.9–12.7)
RBC # BLD AUTO: 4.04 MILLION/UL (ref 3.81–5.12)
T4 FREE SERPL-MCNC: 1.26 NG/DL (ref 0.76–1.46)
TSH SERPL DL<=0.05 MIU/L-ACNC: 0.43 UIU/ML (ref 0.45–4.5)
WBC # BLD AUTO: 8.54 THOUSAND/UL (ref 4.31–10.16)

## 2022-09-28 PROCEDURE — 82306 VITAMIN D 25 HYDROXY: CPT

## 2022-09-28 PROCEDURE — 86618 LYME DISEASE ANTIBODY: CPT

## 2022-09-28 PROCEDURE — 84439 ASSAY OF FREE THYROXINE: CPT

## 2022-09-28 PROCEDURE — 36415 COLL VENOUS BLD VENIPUNCTURE: CPT

## 2022-09-28 PROCEDURE — 85025 COMPLETE CBC W/AUTO DIFF WBC: CPT

## 2022-09-28 PROCEDURE — 84443 ASSAY THYROID STIM HORMONE: CPT

## 2022-09-29 LAB — B BURGDOR IGG+IGM SER-ACNC: <0.2 AI

## 2022-09-30 ENCOUNTER — TELEPHONE (OUTPATIENT)
Dept: GASTROENTEROLOGY | Facility: CLINIC | Age: 22
End: 2022-09-30

## 2022-09-30 NOTE — TELEPHONE ENCOUNTER
Patients GI provider:  Easton RUTHERFORD    Number to return call: [phone]    Reason for call: Pt calling because she states Lisa Hairston was going to put in a referral for General Surgery, but she has not received the referral or a phone call    Scheduled procedure/appointment date if applicable: N/A

## 2022-10-05 DIAGNOSIS — F41.9 ANXIETY AND DEPRESSION: ICD-10-CM

## 2022-10-05 DIAGNOSIS — F32.A ANXIETY AND DEPRESSION: ICD-10-CM

## 2022-10-06 RX ORDER — BUSPIRONE HYDROCHLORIDE 15 MG/1
15 TABLET ORAL 3 TIMES DAILY
Qty: 270 TABLET | Refills: 0 | Status: SHIPPED | OUTPATIENT
Start: 2022-10-06 | End: 2022-10-11 | Stop reason: SDUPTHER

## 2022-10-11 ENCOUNTER — OFFICE VISIT (OUTPATIENT)
Dept: FAMILY MEDICINE CLINIC | Facility: CLINIC | Age: 22
End: 2022-10-11
Payer: COMMERCIAL

## 2022-10-11 VITALS
WEIGHT: 212.8 LBS | BODY MASS INDEX: 33.4 KG/M2 | OXYGEN SATURATION: 97 % | HEIGHT: 67 IN | TEMPERATURE: 96 F | SYSTOLIC BLOOD PRESSURE: 120 MMHG | HEART RATE: 94 BPM | DIASTOLIC BLOOD PRESSURE: 80 MMHG

## 2022-10-11 DIAGNOSIS — F32.A ANXIETY AND DEPRESSION: Primary | ICD-10-CM

## 2022-10-11 DIAGNOSIS — Z12.4 SCREENING FOR CERVICAL CANCER: ICD-10-CM

## 2022-10-11 DIAGNOSIS — R79.89 DECREASED THYROID STIMULATING HORMONE (TSH) LEVEL: ICD-10-CM

## 2022-10-11 DIAGNOSIS — F41.9 ANXIETY AND DEPRESSION: Primary | ICD-10-CM

## 2022-10-11 PROCEDURE — 99214 OFFICE O/P EST MOD 30 MIN: CPT | Performed by: NURSE PRACTITIONER

## 2022-10-11 RX ORDER — BUSPIRONE HYDROCHLORIDE 30 MG/1
30 TABLET ORAL 3 TIMES DAILY
Qty: 90 TABLET | Refills: 1 | Status: SHIPPED | OUTPATIENT
Start: 2022-10-11

## 2022-10-11 NOTE — ASSESSMENT & PLAN NOTE
Will increase BuSpar to 30 mg 3 times daily  Continue Wellbutrin 300 mg  Continue Lexapro 20 mg  Advised to call with any issues with medication  Patient uses Xanax very sparingly as needed

## 2022-10-11 NOTE — PROGRESS NOTES
Assessment/Plan:     Chronic Problems:  Anxiety and depression  Will increase BuSpar to 30 mg 3 times daily  Continue Wellbutrin 300 mg  Continue Lexapro 20 mg  Advised to call with any issues with medication  Patient uses Xanax very sparingly as needed  Visit Diagnosis:  Diagnoses and all orders for this visit:    Anxiety and depression  -     busPIRone (BUSPAR) 30 MG tablet; Take 1 tablet (30 mg total) by mouth 3 (three) times a day    Decreased thyroid stimulating hormone (TSH) level  Comments:  Repeat thyroid testing in 4-6 weeks  Will call with results  Orders:  -     TSH, 3rd generation with Free T4 reflex; Future    Screening for cervical cancer  -     Ambulatory Referral to Obstetrics / Gynecology; Future          Subjective:    Patient ID: Roscoe Flower is a 20 y o  female  Patient presents for follow up on anxiety and depression  Anxiety is still an issue  Depression/motivation feels better with wellbutrin  6-7/10 for anxiety  The following portions of the patient's history were reviewed and updated as appropriate: allergies, current medications, past family history, past medical history, past social history, past surgical history and problem list     Review of Systems   Constitutional: Negative for chills and fever  HENT: Negative for ear pain and sore throat  Eyes: Negative for pain and visual disturbance  Respiratory: Negative for cough and shortness of breath  Cardiovascular: Negative for chest pain and palpitations  Gastrointestinal: Negative for abdominal pain and vomiting  Genitourinary: Negative for dysuria and hematuria  Musculoskeletal: Negative for arthralgias and back pain  Skin: Negative for color change and rash  Neurological: Negative for seizures and syncope  Psychiatric/Behavioral: Positive for dysphoric mood  The patient is nervous/anxious  All other systems reviewed and are negative          /80 (BP Location: Left arm, Patient Position: Sitting, Cuff Size: Standard)   Pulse 94   Temp (!) 96 °F (35 6 °C) (Tympanic)   Ht 5' 7" (1 702 m)   Wt 96 5 kg (212 lb 12 8 oz)   SpO2 97%   BMI 33 33 kg/m²   Social History     Socioeconomic History   • Marital status: Single     Spouse name: Not on file   • Number of children: Not on file   • Years of education: Not on file   • Highest education level: Not on file   Occupational History   • Not on file   Tobacco Use   • Smoking status: Never Smoker   • Smokeless tobacco: Never Used   Vaping Use   • Vaping Use: Never used   Substance and Sexual Activity   • Alcohol use: No   • Drug use: No   • Sexual activity: Yes     Partners: Male     Birth control/protection: OCP   Other Topics Concern   • Not on file   Social History Narrative   • Not on file     Social Determinants of Health     Financial Resource Strain: Not on file   Food Insecurity: Not on file   Transportation Needs: Not on file   Physical Activity: Not on file   Stress: Not on file   Social Connections: Not on file   Intimate Partner Violence: Not on file   Housing Stability: Not on file     Past Medical History:   Diagnosis Date   • Anxiety    • Depression    • GERD (gastroesophageal reflux disease)    • Migraine      Family History   Problem Relation Age of Onset   • Breast cancer Mother 39   • Hypertension Mother    • Hypertension Father    • Asthma Brother    • Heart defect Brother      Past Surgical History:   Procedure Laterality Date   • ANTERIOR CRUCIATE LIGAMENT REPAIR  2016       Current Outpatient Medications:   •  ALPRAZolam (XANAX) 0 5 mg tablet, Take 1 tablet (0 5 mg total) by mouth daily at bedtime as needed for anxiety, Disp: 30 tablet, Rfl: 0  •  buPROPion (Wellbutrin XL) 300 mg 24 hr tablet, Take 1 tablet (300 mg total) by mouth every morning, Disp: 90 tablet, Rfl: 0  •  busPIRone (BUSPAR) 30 MG tablet, Take 1 tablet (30 mg total) by mouth 3 (three) times a day, Disp: 90 tablet, Rfl: 1  •  escitalopram (Lexapro) 20 mg tablet, Take 1 tablet (20 mg total) by mouth daily, Disp: 90 tablet, Rfl: 1  •  ondansetron (ZOFRAN-ODT) 4 mg disintegrating tablet, Take 1 tablet (4 mg total) by mouth every 6 (six) hours as needed for nausea or vomiting, Disp: 20 tablet, Rfl: 0  •  pantoprazole (PROTONIX) 40 mg tablet, Take 1 tablet (40 mg total) by mouth 2 (two) times a day, Disp: 180 tablet, Rfl: 1  •  sucralfate (CARAFATE) 1 g/10 mL suspension, Take 10 mL (1 g total) by mouth 4 (four) times a day, Disp: 420 mL, Rfl: 0  •  traZODone (DESYREL) 50 mg tablet, Take 1 tablet (50 mg total) by mouth daily at bedtime as needed for sleep, Disp: 90 tablet, Rfl: 0    Allergies   Allergen Reactions   • Nickel           Lab Review   Appointment on 09/28/2022   Component Date Value   • WBC 09/28/2022 8 54    • RBC 09/28/2022 4 04    • Hemoglobin 09/28/2022 12 5    • Hematocrit 09/28/2022 37 5    • MCV 09/28/2022 93    • MCH 09/28/2022 30 9    • MCHC 09/28/2022 33 3    • RDW 09/28/2022 12 6    • MPV 09/28/2022 11 1    • Platelets 65/11/2242 272    • nRBC 09/28/2022 0    • Neutrophils Relative 09/28/2022 70    • Immat GRANS % 09/28/2022 0    • Lymphocytes Relative 09/28/2022 22    • Monocytes Relative 09/28/2022 7    • Eosinophils Relative 09/28/2022 1    • Basophils Relative 09/28/2022 0    • Neutrophils Absolute 09/28/2022 5 92    • Immature Grans Absolute 09/28/2022 0 03    • Lymphocytes Absolute 09/28/2022 1 87    • Monocytes Absolute 09/28/2022 0 61    • Eosinophils Absolute 09/28/2022 0 08    • Basophils Absolute 09/28/2022 0 03    • TSH 3RD GENERATON 09/28/2022 0 433 (A)   • Vit D, 25-Hydroxy 09/28/2022 30 6    • Lyme Total Antibodies 09/28/2022 <0 2    • Free T4 09/28/2022 1 26         Imaging: No results found  Objective:     Physical Exam  Constitutional:       Appearance: She is well-developed  Cardiovascular:      Rate and Rhythm: Normal rate and regular rhythm  Heart sounds: Normal heart sounds  No murmur heard    Pulmonary:      Effort: Pulmonary effort is normal  No respiratory distress  Breath sounds: Normal breath sounds  Skin:     General: Skin is warm and dry  Neurological:      Mental Status: She is alert and oriented to person, place, and time  There are no Patient Instructions on file for this visit  LEONILA Thurston    Portions of the record may have been created with voice recognition software  Occasional wrong word or "sound a like" substitutions may have occurred due to the inherent limitations of voice recognition software  Read the chart carefully and recognize, using context, where substitutions have occurred

## 2022-10-12 DIAGNOSIS — F41.9 ANXIETY AND DEPRESSION: ICD-10-CM

## 2022-10-12 DIAGNOSIS — F32.A ANXIETY AND DEPRESSION: ICD-10-CM

## 2022-10-13 ENCOUNTER — TELEPHONE (OUTPATIENT)
Dept: SURGERY | Facility: CLINIC | Age: 22
End: 2022-10-13

## 2022-10-13 RX ORDER — ALPRAZOLAM 0.5 MG/1
0.5 TABLET ORAL
Qty: 30 TABLET | Refills: 0 | Status: SHIPPED | OUTPATIENT
Start: 2022-10-13

## 2022-10-14 ENCOUNTER — OFFICE VISIT (OUTPATIENT)
Dept: SURGERY | Facility: CLINIC | Age: 22
End: 2022-10-14
Payer: COMMERCIAL

## 2022-10-14 VITALS
DIASTOLIC BLOOD PRESSURE: 86 MMHG | BODY MASS INDEX: 33.27 KG/M2 | HEIGHT: 67 IN | SYSTOLIC BLOOD PRESSURE: 126 MMHG | HEART RATE: 71 BPM | WEIGHT: 212 LBS

## 2022-10-14 PROCEDURE — 99203 OFFICE O/P NEW LOW 30 MIN: CPT | Performed by: SURGERY

## 2022-10-14 NOTE — PROGRESS NOTES
Consult- General Surgery   Dee Cosme 20 y o  female MRN: [id number]  Unit/Bed#:  Encounter: [id number]    Assessment/Plan     Assessment:  Granuloma of umbilicus secondary to foreign body  Plan:  Patient was given specific instructions of the treatment  Patient will follow instructions accordingly, no surgical intervention is needed unless this becomes an infected granuloma  She is discharged from my care at this time  History of Present Illness     HPI:  Dee Cosme is a 20 y o  female who presents to my office for evaluation of drainage from the umbilicus  The patient has noted occasional drainage with some green of dry blood around the umbilicus since the age of 24  The patient was seen in the emergency room at that time in treated empirically  She continued having drainage, occasional foul-smelling, it could be clear or with pus at times, she has noted occasional redness around the umbilicus, no induration  She denied having any fever, chills or any other constitutional symptoms  The last episode occurred approximately 2 weeks ago, she still has some drainage but slowly improving  Review of Systems: The rest of the review of system total of 10 were negative except for the HPI      Historical Information   Past Medical History:   Diagnosis Date   • Anxiety    • Depression    • GERD (gastroesophageal reflux disease)    • Migraine      Past Surgical History:   Procedure Laterality Date   • ANTERIOR CRUCIATE LIGAMENT REPAIR  2016     Social History   Social History     Substance and Sexual Activity   Alcohol Use No     Social History     Substance and Sexual Activity   Drug Use No     Social History     Tobacco Use   Smoking Status Never Smoker   Smokeless Tobacco Never Used     Family History: non-contributory    Meds/Allergies   all medications and allergies reviewed     Current Outpatient Medications:   •  ALPRAZolam (XANAX) 0 5 mg tablet, Take 1 tablet (0 5 mg total) by mouth daily at bedtime as needed for anxiety, Disp: 30 tablet, Rfl: 0  •  buPROPion (Wellbutrin XL) 300 mg 24 hr tablet, Take 1 tablet (300 mg total) by mouth every morning, Disp: 90 tablet, Rfl: 0  •  busPIRone (BUSPAR) 30 MG tablet, Take 1 tablet (30 mg total) by mouth 3 (three) times a day, Disp: 90 tablet, Rfl: 1  •  escitalopram (Lexapro) 20 mg tablet, Take 1 tablet (20 mg total) by mouth daily, Disp: 90 tablet, Rfl: 0  •  ondansetron (ZOFRAN-ODT) 4 mg disintegrating tablet, Take 1 tablet (4 mg total) by mouth every 6 (six) hours as needed for nausea or vomiting, Disp: 20 tablet, Rfl: 0  •  pantoprazole (PROTONIX) 40 mg tablet, Take 1 tablet (40 mg total) by mouth 2 (two) times a day, Disp: 180 tablet, Rfl: 0  •  sucralfate (CARAFATE) 1 g/10 mL suspension, Take 10 mL (1 g total) by mouth 4 (four) times a day, Disp: 420 mL, Rfl: 0  •  traZODone (DESYREL) 50 mg tablet, Take 1 tablet (50 mg total) by mouth daily at bedtime as needed for sleep, Disp: 90 tablet, Rfl: 0  Allergies   Allergen Reactions   • Nickel        Objective     Current Vitals:   Blood Pressure: 126/86 (10/14/22 1253)  Pulse: 71 (10/14/22 1253)  Height: 5' 7" (170 2 cm) (10/14/22 1253)  Weight - Scale: 96 2 kg (212 lb) (10/14/22 1253)    Physical Exam  Vitals and nursing note reviewed  Constitutional:       General: She is not in acute distress  Appearance: She is obese  Cardiovascular:      Rate and Rhythm: Normal rate and regular rhythm  Heart sounds: No murmur heard  Pulmonary:      Effort: No respiratory distress  Breath sounds: Normal breath sounds  Abdominal:      Comments: Abdomen is obese, soft, nondistended and nontender  Examination of the umbilicus revealed known human hair within the umbilicus, most likely Dr Isaac Truong, patient confirms the above  The hair most likely irritating the skin at the bottom of the umbilicus causing irritation of the skin, leading to drainage    Patient was given specific instructions to avoid hair in the umbilicus and to keep the area clean  Skin:     General: Skin is warm  Coloration: Skin is not jaundiced  Findings: No erythema or rash  Neurological:      Mental Status: She is alert and oriented to person, place, and time  Cranial Nerves: No cranial nerve deficit     Psychiatric:         Mood and Affect: Mood normal          Behavior: Behavior normal

## 2022-10-27 DIAGNOSIS — F41.9 ANXIETY AND DEPRESSION: Primary | ICD-10-CM

## 2022-10-27 DIAGNOSIS — F32.A ANXIETY AND DEPRESSION: Primary | ICD-10-CM

## 2022-10-27 RX ORDER — CITALOPRAM 20 MG/1
20 TABLET ORAL DAILY
Qty: 30 TABLET | Refills: 1 | Status: SHIPPED | OUTPATIENT
Start: 2022-10-27 | End: 2022-11-26

## 2022-10-28 DIAGNOSIS — F41.9 ANXIETY AND DEPRESSION: Primary | ICD-10-CM

## 2022-10-28 DIAGNOSIS — F32.A ANXIETY AND DEPRESSION: Primary | ICD-10-CM

## 2022-10-31 ENCOUNTER — TELEPHONE (OUTPATIENT)
Dept: PSYCHIATRY | Facility: CLINIC | Age: 22
End: 2022-10-31

## 2022-10-31 NOTE — TELEPHONE ENCOUNTER
Called [facility]  regarding routine  Lvm advising to return call to Intake Dept at [phone] to be placed on appropriate wait list

## 2022-11-03 DIAGNOSIS — F41.9 ANXIETY AND DEPRESSION: ICD-10-CM

## 2022-11-03 DIAGNOSIS — F32.A ANXIETY AND DEPRESSION: ICD-10-CM

## 2022-11-03 RX ORDER — BUSPIRONE HYDROCHLORIDE 30 MG/1
TABLET ORAL
Qty: 270 TABLET | Refills: 1 | Status: SHIPPED | OUTPATIENT
Start: 2022-11-03

## 2022-11-03 NOTE — TELEPHONE ENCOUNTER
Called patient in regards to routine referral  Lvm advising patient to return call to Intake Dept at [phone] to be placed on appropriate wait list

## 2022-11-09 ENCOUNTER — TELEPHONE (OUTPATIENT)
Dept: PSYCHIATRY | Facility: CLINIC | Age: 22
End: 2022-11-09

## 2022-11-16 DIAGNOSIS — F32.A ANXIETY AND DEPRESSION: ICD-10-CM

## 2022-11-16 DIAGNOSIS — F41.9 ANXIETY AND DEPRESSION: ICD-10-CM

## 2022-11-17 RX ORDER — ALPRAZOLAM 0.5 MG/1
0.5 TABLET ORAL
Qty: 30 TABLET | Refills: 0 | Status: SHIPPED | OUTPATIENT
Start: 2022-11-17

## 2022-11-26 DIAGNOSIS — F32.A ANXIETY AND DEPRESSION: ICD-10-CM

## 2022-11-26 DIAGNOSIS — F41.9 ANXIETY AND DEPRESSION: ICD-10-CM

## 2022-11-26 RX ORDER — CITALOPRAM 20 MG/1
TABLET ORAL
Qty: 90 TABLET | Refills: 1 | Status: SHIPPED | OUTPATIENT
Start: 2022-11-26

## 2022-12-14 DIAGNOSIS — F32.A ANXIETY AND DEPRESSION: ICD-10-CM

## 2022-12-14 DIAGNOSIS — F41.9 ANXIETY AND DEPRESSION: ICD-10-CM

## 2022-12-14 RX ORDER — ALPRAZOLAM 0.5 MG/1
0.5 TABLET ORAL
Qty: 30 TABLET | Refills: 0 | Status: SHIPPED | OUTPATIENT
Start: 2022-12-14

## 2022-12-29 ENCOUNTER — OFFICE VISIT (OUTPATIENT)
Dept: OBGYN CLINIC | Age: 22
End: 2022-12-29

## 2022-12-29 VITALS
SYSTOLIC BLOOD PRESSURE: 126 MMHG | BODY MASS INDEX: 33.27 KG/M2 | WEIGHT: 212 LBS | DIASTOLIC BLOOD PRESSURE: 82 MMHG | HEIGHT: 67 IN

## 2022-12-29 DIAGNOSIS — Z01.419 ENCOUNTER FOR ANNUAL ROUTINE GYNECOLOGICAL EXAMINATION: Primary | ICD-10-CM

## 2022-12-29 DIAGNOSIS — Z11.3 SCREENING FOR STD (SEXUALLY TRANSMITTED DISEASE): ICD-10-CM

## 2022-12-29 DIAGNOSIS — Z12.4 SCREENING FOR CERVICAL CANCER: ICD-10-CM

## 2022-12-29 NOTE — PROGRESS NOTES
Camille Baxter [facility]  2000    Assessment and Plan:  Yearly exam    ASCCP guidelines reviewed- first pap done today  Safe sex practices and condom use encouraged  Briefly discussed BC - she is not interested in starting a BCM at this time but would like information  NSAIDs recommended for dysmenorrhea  SBE, daily exercise and healthy diet with adequate calcium and vitamin D encouraged  Weight bearing exercises a minimum of 150 minutes/week advised  Advised to call with any issues, all concerns & questions addressed  See provided information in your after visit summary     Diagnoses and all orders for this visit:    Encounter for annual routine gynecological examination    Screening for cervical cancer  -     Liquid-based pap, screening  -     Ambulatory Referral to Obstetrics / Gynecology    Screening for STD (sexually transmitted disease)  -     Chlamydia/GC amplified DNA by PCR        F/U Annually and PRN    Health Maintenance:    First pap done today  Colonoscopy due every 5 years for personal history of precancerous polyps  Gardasil Vaccine Series: She has had the Gardasil series  Subjective    CC: Yearly Exam     Marietta Johanny is a 20 y o  female  here for an annual exam       Menarche: [address]  Patient's last menstrual period was 12/29/2022 (exact date)  Sexual activity: She is sexually active without pain, bleeding or dryness  Contraception: none- might be interested in starting a BC soon but is afraid of how it will affect her anxiety/depression  She is in the middle of switching psychiatric care and would like to wait before introducing hormonal contraception  STD testing:  She does want STD testing today  Declined serology testing  Non- smoker, occasional drinker  Exercise- not currently    Her menstrual cycles are regular every 28-30 days  Reports PMS symptoms including mood swings and dysmenorrhea  Usually takes tylenol for her cramps with little relief  She denies breast concerns, abnormal vaginal discharge, vaginal itching, odor, irritation, bowel/bladder dysfunction, urinary symptoms, pelvic pain, or dyspareunia today       Family hx of breast cancer: Yes - Mother (39)  Family hx of ovarian cancer: No  Family hx of colon cancer: No    Past Medical History:   Diagnosis Date   • Anxiety    • Depression    • GERD (gastroesophageal reflux disease)    • Migraine      Past Surgical History:   Procedure Laterality Date   • ANTERIOR CRUCIATE LIGAMENT REPAIR  2016       Immunization History   Administered Date(s) Administered   • DTaP 2000, 2000, 03/02/2000, 01/26/2001   • H1N1, All Formulations 11/19/2009   • HPV Quadrivalent 08/08/2014, 03/27/2015   • HPV9 08/14/2015   • Hep A, ped/adol, 2 dose 06/04/2016, 07/01/2017   • Hep B, Adolescent or Pediatric 03/02/2000, 10/19/2000, 10/04/2001   • Hepatitis A 06/04/2016, 07/01/2017   • HiB 1999, 1999, 03/02/2000, 01/26/2001   • Hib (PRP-OMP) 1999, 1999, 03/02/2000, 01/26/2001   • INFLUENZA 11/29/2010   • IPV 1999, 01/28/2000, 01/26/2001   • MMR 10/19/2000, 12/01/2003, 10/10/2019   • Meningococcal B, Recombinant ([place]) 06/04/2016, 07/01/2017   • Meningococcal MCV4P 01/08/2011, 06/04/2016   • Pneumococcal Conjugate 13-Valent 03/13/2001, 10/04/2001   • Pneumococcal Conjugate PCV 7 10/04/2001   • Tdap 01/08/2011   • Varicella 10/19/2000, 11/29/2010       Family History   Problem Relation Age of Onset   • Breast cancer Mother 39   • Hypertension Mother    • Hypertension Father    • Asthma Brother    • Heart defect Brother    • Colon cancer Neg Hx    • Ovarian cancer Neg Hx    • Uterine cancer Neg Hx    • Cervical cancer Neg Hx      Social History     Tobacco Use   • Smoking status: Never   • Smokeless tobacco: Never   Vaping Use   • Vaping Use: Never used   Substance Use Topics   • Alcohol use: No   • Drug use: Yes     Types: Marijuana       Current Outpatient Medications:   • ALPRAZolam (XANAX) 0 5 mg tablet, Take 1 tablet (0 5 mg total) by mouth daily at bedtime as needed for anxiety, Disp: 30 tablet, Rfl: 0  •  buPROPion (Wellbutrin XL) 300 mg 24 hr tablet, Take 1 tablet (300 mg total) by mouth every morning, Disp: 90 tablet, Rfl: 0  •  busPIRone (BUSPAR) 30 MG tablet, TAKE 1 TABLET BY MOUTH 3 TIMES A DAY , Disp: 270 tablet, Rfl: 1  •  citalopram (CeleXA) 20 mg tablet, TAKE 1 TABLET BY MOUTH EVERY DAY, Disp: 90 tablet, Rfl: 1  •  pantoprazole (PROTONIX) 40 mg tablet, Take 1 tablet (40 mg total) by mouth 2 (two) times a day, Disp: 180 tablet, Rfl: 0  •  traZODone (DESYREL) 50 mg tablet, Take 1 tablet (50 mg total) by mouth daily at bedtime as needed for sleep, Disp: 90 tablet, Rfl: 0  Patient Active Problem List    Diagnosis Date Noted   • Umbilical granuloma    • BMI 32 0-32 9,adult 10/08/2021   • Primary insomnia 2020   • Gastroesophageal reflux disease without esophagitis 2020   • Irritable bowel syndrome with both constipation and diarrhea 2020   • Anxiety and depression 2020       Allergies   Allergen Reactions   • Nickel        OB History    Para Term  AB Living   0 0 0 0 0 0   SAB IAB Ectopic Multiple Live Births   0 0 0 0 0       Vitals:    22 1311   BP: 126/82   BP Location: Right arm   Patient Position: Sitting   Cuff Size: Standard   Weight: 96 2 kg (212 lb)   Height: 5' 7" (1 702 m)     Body mass index is 33 2 kg/m²  Review of Systems   Constitutional: Negative for chills, fatigue, fever and unexpected weight change  Respiratory: Negative for shortness of breath  Cardiovascular: Negative for chest pain  Gastrointestinal: Negative for abdominal pain, constipation, diarrhea, nausea and vomiting  Endocrine: Negative  Genitourinary: Positive for menstrual problem (dysmenorrhea) and vaginal bleeding (first day of menses today - flow is light/spotting  )   Negative for difficulty urinating, dyspareunia, dysuria, frequency, genital sores, hematuria, pelvic pain, urgency, vaginal discharge and vaginal pain  Musculoskeletal: Negative for back pain and myalgias  Skin: Negative for pallor and rash  Neurological: Negative for headaches  Hematological: Negative for adenopathy  Psychiatric/Behavioral: Negative for dysphoric mood  All other systems reviewed and are negative  Physical Exam  Constitutional:       General: She is not in acute distress  Appearance: Normal appearance  She is not ill-appearing  Genitourinary:      Bladder and urethral meatus normal       No lesions in the vagina  Right Labia: No rash, tenderness, lesions, skin changes or Bartholin's cyst      Left Labia: No tenderness, lesions, skin changes, Bartholin's cyst or rash  No inguinal adenopathy present in the right or left side  Vaginal bleeding (scant amount of bright red blood from menses present) present  No vaginal discharge, erythema, tenderness or ulceration  Right Adnexa: not tender, not full and no mass present  Left Adnexa: not tender, not full and no mass present  Cervix is nulliparous  No cervical motion tenderness, discharge, friability, lesion, polyp, nabothian cyst, eversion or elongation  Uterus is not enlarged, fixed or tender  No uterine mass detected  No urethral prolapse, tenderness or discharge present  Bladder is not tender and urgency on palpation not present  Pelvic exam was performed with patient in the lithotomy position  Breasts:     Right: No swelling, inverted nipple, mass, nipple discharge, skin change or tenderness  Left: No swelling, inverted nipple, mass, nipple discharge, skin change or tenderness  HENT:      Head: Normocephalic and atraumatic  Eyes:      Conjunctiva/sclera: Conjunctivae normal    Pulmonary:      Effort: Pulmonary effort is normal    Abdominal:      General: There is no distension        Palpations: Abdomen is soft       Tenderness: There is no abdominal tenderness  Musculoskeletal:         General: Normal range of motion  Cervical back: Neck supple  Lymphadenopathy:      Upper Body:      Right upper body: No supraclavicular or axillary adenopathy  Left upper body: No supraclavicular or axillary adenopathy  Lower Body: No right inguinal adenopathy  No left inguinal adenopathy  Neurological:      Mental Status: She is alert and oriented to person, place, and time  Skin:     General: Skin is warm and dry  Psychiatric:         Mood and Affect: Mood normal          Behavior: Behavior normal          Thought Content: Thought content normal          Judgment: Judgment normal    Vitals and nursing note reviewed

## 2022-12-29 NOTE — PATIENT INSTRUCTIONS
Prophylactic NSAID therapy for Painful or Heavy menses     Ibuprofen or Naproxen (chose 1 or the other, do not take both), Dose as noted on the box  Typically Ibuprofen dose is 600 mg, (3 tablets) every 6-8 hours  Typically Naproxen dose is 500 mg every 12 hours  Start taking medication 2 days prior to onset of menses and continue taking through the first 3 days of menses  Make sure you take consistently this is important  You need to take with food to decrease any gastrointestinal upset effects    This is proven therapy to reduce you flow and cramping by 50 %    Life style changes that have a positive effect on painful and heavy periods are as follows   Daily physical exercise    Increase fiber, fresh fruits and vegetables in your diet    Increase daily water intake    Heating pads(do not apply directly to skin, apply over clothing or towel)   Warm Baths   Relaxation techniques, meditation, massage, yoga and mindfulness     These are all suggestion for improving your sense of frustrations with your menstrual cycle and improving your overall wellness and lifestyle   Dysmenorrhea   WHAT YOU NEED TO KNOW:   What is dysmenorrhea? Dysmenorrhea is painful menstrual cramps at or around the time of your monthly period  What causes dysmenorrhea? Your body normally produces chemicals each month to help your uterus contract  When too many of these chemicals are made, your uterus contracts too much and causes pain  Dysmenorrhea may also be caused by any of the following:  Abnormal structure of your uterus or vagina    A narrow cervix    Growth in or on your uterus or ovaries    Medical conditions, such as pelvic inflammatory disease, endometriosis, or uterine fibroids    A copper intrauterine device (IUD)    What increases my risk for dysmenorrhea?    Never been pregnant    Obesity    Smoking    Family history of painful menstrual cramps    Pelvic infection    Longer monthly period cycle    Medical conditions, such as a sexually transmitted infection or endometriosis    What are the signs and symptoms of dysmenorrhea? Mild to severe pain    Cramping pain in lower abdomen or low back    Bloating    Headache    Diarrhea    How is dysmenorrhea diagnosed? Your healthcare provider can usually diagnose dysmenorrhea by your signs and symptoms  Tell him or her when your symptoms started and if you have pain between your monthly periods  He or she may ask if anything relieves your pain, such as heat or medicine  Tell your provider if you are sexually active or have ever been pregnant  You may need any of the following:  A blood test  will check for pregnancy  A pelvic exam  may be needed to check the size and shape of your uterus and ovaries  Your healthcare provider gently inserts a warmed speculum into your vagina  A speculum is a tool that opens your vagina to show your cervix  A cervical culture  may be needed to check for infection  Your healthcare provider will use a swab to collect a sample of cells from your cervix  This will be sent to a lab for tests  An ultrasound  will show abnormal structure of your reproductive organs  Sound waves are used to show pictures on a monitor  How is dysmenorrhea treated? Dysmenorrhea can be controlled with lifestyle changes and medicines  It usually improves with age and pregnancy  Medicines:      NSAIDs  help decrease swelling and pain or fever  This medicine is available with or without a doctor's order  NSAIDs can cause stomach bleeding or kidney problems in certain people  If you take blood thinner medicine, always ask your healthcare provider if NSAIDs are safe for you  Always read the medicine label and follow directions  Birth control medicine  may help decrease your pain  This medicine may be birth control pills or an IUD that does not contain copper  Transcutaneous electric nerve stimulation  (TENS), is a device used to stimulate your nerves and decrease pain   Ask your healthcare provider for more information about TENS  How can I manage my symptoms? Eat low-fat foods  Increase the amount of vegetables and raw seeds you eat  Ask your healthcare provider if you should take vitamin B or magnesium supplements  These will help decrease your pain  Do not eat dairy products or eggs  Apply heat  on your lower abdomen for 20 to 30 minutes every 2 hours for as many days as directed  Heat helps decrease pain and muscle spasms  Manage your stress  Stress can make your symptoms worse  Try relaxation exercises, such as deep breathing  Exercise regularly  Ask your healthcare provider about the best exercise plan for you  Exercise can help decrease pain  Keep a record of your pain  Write down when your pain and periods start and stop  Bring the record with you to your follow-up visits  Do not smoke  Avoid others who smoke  If you smoke, it is never too late to quit  Smoking can increase your risk for dysmenorrhea  Ask your healthcare provider for information if you need help quitting  When should I contact my healthcare provider? You have anxiety or feel depressed  Your periods are early, late, or more painful than usual     You have questions or concerns about your condition or care  When should I seek immediate care or call 911? You have severe pain  You have heavy vaginal bleeding and you feel faint  You have sudden chest pain and trouble breathing  CARE AGREEMENT:   You have the right to help plan your care  Learn about your health condition and how it may be treated  Discuss treatment options with your healthcare providers to decide what care you want to receive  You always have the right to refuse treatment  The above information is an  only  It is not intended as medical advice for individual conditions or treatments   Talk to your doctor, nurse or pharmacist before following any medical regimen to see if it is safe and effective for you  © Copyright [employer] 2022 Information is for End User's use only and may not be sold, redistributed or otherwise used for commercial purposes  All illustrations and images included in CareNotes® are the copyrighted property of [employer]  or Natalie Moreira    Pap Smear   AMBULATORY CARE:   A Pap smear,  or Pap test, is used to screen for cervical cancer  It is also used to find precancerous and cancerous cells of the vulva and vagina  Prepare for a Pap smear: The best time to schedule the test is right after your period stops  Do not have a Pap smear during your monthly period  During a Pap smear:   You will lie on your back and place your feet on footrests called stirrups  Your healthcare provider will gently insert a device called a speculum into your vagina  The speculum is used to open the walls of your vagina so your provider can see your cervix  Your provider will gently take cell samples from your cervix and vagina  The samples are placed in a container with liquid or on a glass slide  They are sent to a lab and examined for abnormal cells  A test for human papillomavirus (HPV) may be done at the same time  HPV is a sexually transmitted virus that can cause changes in cervical cells  After a Pap smear:  You may have some spotting the day of your procedure  Test results: Your healthcare provider will tell you when you can expect your Pap smear results  It usually takes about 1 to 3 weeks  Normal results  mean that no cell changes were found on your cervix  You may be able to wait 3 to 5 years for your next Pap smear exam     Unclear results  may mean they did not get a good sample of cervical tissue  Or, it may mean there are changes in your cells that look abnormal  This could be due to an infection, pregnancy, menopause, or HPV  You may need another Pap smear in 1 year  Your healthcare provider will tell you what to do next       Abnormal results  mean that cell changes were found on your cervix  This does not mean you have cervical cancer  It could mean you have inflammation or an infection  It could also mean you have HPV or cells that might develop into cancer  Your healthcare provider will explain the abnormal test result to you and go over next steps with you  He or she may recommend a colposcopy  During this procedure, a small scope with a light is used to look more closely at your cervix and vagina  How often to get a Pap smear:  Pap smears usually start at age 24 and continue until age 72  A Pap smear alone may be done every 3 years  An HPV test alone or with a Pap smear may be done every 5 years, starting at age 27  You may need Pap smears more often or after age 72 if you have any of the following:  Abnormal Pap smear result    Positive HPV test result    A history of cervical cancer, or a high risk for cervical cancer    HIV    A weak immune system    Exposure to diethylstilbestrol (SACHA) medicine when your mother was pregnant with you    Call your doctor if:   You have severe bleeding  It has been 3 weeks and you do not have test results  You have questions or concerns about your condition or care  © Copyright [employer] 2022 Information is for End User's use only and may not be sold, redistributed or otherwise used for commercial purposes  All illustrations and images included in CareNotes® are the copyrighted property of [employer]  or Natalie Moreira  The above information is an  only  It is not intended as medical advice for individual conditions or treatments  Talk to your doctor, nurse or pharmacist before following any medical regimen to see if it is safe and effective for you

## 2022-12-29 NOTE — PROGRESS NOTES
Patient is here today for a gynecological annual exam    C/o sharp, sudden vaginal pain at times that will last approx  20 seconds    LMP: 2022   Menarche age: 7    BC: None    Last pap: Not on file - first one done today  Last Colonoscopy: 10/19/2020 - next due in     Gardasil: Series completed

## 2022-12-30 LAB
C TRACH DNA SPEC QL NAA+PROBE: NEGATIVE
N GONORRHOEA DNA SPEC QL NAA+PROBE: NEGATIVE

## 2023-01-02 DIAGNOSIS — K21.9 GASTROESOPHAGEAL REFLUX DISEASE WITHOUT ESOPHAGITIS: Primary | ICD-10-CM

## 2023-01-03 LAB
LAB AP GYN PRIMARY INTERPRETATION: NORMAL
Lab: NORMAL

## 2023-01-03 RX ORDER — PANTOPRAZOLE SODIUM 40 MG/1
40 TABLET, DELAYED RELEASE ORAL DAILY
Qty: 30 TABLET | Refills: 2 | Status: SHIPPED | OUTPATIENT
Start: 2023-01-03 | End: 2023-01-09 | Stop reason: SDUPTHER

## 2023-01-09 DIAGNOSIS — K21.9 GASTROESOPHAGEAL REFLUX DISEASE WITHOUT ESOPHAGITIS: ICD-10-CM

## 2023-01-10 RX ORDER — PANTOPRAZOLE SODIUM 40 MG/1
40 TABLET, DELAYED RELEASE ORAL DAILY
Qty: 30 TABLET | Refills: 0 | Status: SHIPPED | OUTPATIENT
Start: 2023-01-10 | End: 2023-04-10

## 2023-01-17 DIAGNOSIS — F41.9 ANXIETY AND DEPRESSION: ICD-10-CM

## 2023-01-17 DIAGNOSIS — F32.A ANXIETY AND DEPRESSION: ICD-10-CM

## 2023-01-17 RX ORDER — ALPRAZOLAM 0.5 MG/1
0.5 TABLET ORAL
Qty: 30 TABLET | Refills: 0 | Status: SHIPPED | OUTPATIENT
Start: 2023-01-17

## 2023-02-03 DIAGNOSIS — F51.01 PRIMARY INSOMNIA: ICD-10-CM

## 2023-02-03 RX ORDER — TRAZODONE HYDROCHLORIDE 50 MG/1
50 TABLET ORAL
Qty: 30 TABLET | Refills: 2 | Status: SHIPPED | OUTPATIENT
Start: 2023-02-03

## 2023-02-04 DIAGNOSIS — F32.A ANXIETY AND DEPRESSION: ICD-10-CM

## 2023-02-04 DIAGNOSIS — K21.9 GASTROESOPHAGEAL REFLUX DISEASE WITHOUT ESOPHAGITIS: ICD-10-CM

## 2023-02-04 DIAGNOSIS — F41.9 ANXIETY AND DEPRESSION: ICD-10-CM

## 2023-02-04 RX ORDER — PANTOPRAZOLE SODIUM 40 MG/1
TABLET, DELAYED RELEASE ORAL
Qty: 90 TABLET | Refills: 1 | Status: SHIPPED | OUTPATIENT
Start: 2023-02-04

## 2023-02-04 RX ORDER — BUPROPION HYDROCHLORIDE 300 MG/1
300 TABLET ORAL EVERY MORNING
Qty: 90 TABLET | Refills: 0 | Status: SHIPPED | OUTPATIENT
Start: 2023-02-04